# Patient Record
Sex: MALE | Race: WHITE | NOT HISPANIC OR LATINO | Employment: OTHER | URBAN - METROPOLITAN AREA
[De-identification: names, ages, dates, MRNs, and addresses within clinical notes are randomized per-mention and may not be internally consistent; named-entity substitution may affect disease eponyms.]

---

## 2017-02-08 ENCOUNTER — HOSPITAL ENCOUNTER (OUTPATIENT)
Dept: LAB | Facility: MEDICAL CENTER | Age: 72
End: 2017-02-08
Attending: FAMILY MEDICINE
Payer: MEDICARE

## 2017-02-08 LAB
ALBUMIN SERPL BCP-MCNC: 4.3 G/DL (ref 3.2–4.9)
ALBUMIN/GLOB SERPL: 1.7 G/DL
ALP SERPL-CCNC: 85 U/L (ref 30–99)
ALT SERPL-CCNC: 13 U/L (ref 2–50)
ANION GAP SERPL CALC-SCNC: 6 MMOL/L (ref 0–11.9)
AST SERPL-CCNC: 19 U/L (ref 12–45)
BASOPHILS # BLD AUTO: 0.05 K/UL (ref 0–0.12)
BASOPHILS NFR BLD AUTO: 0.7 % (ref 0–1.8)
BILIRUB SERPL-MCNC: 0.9 MG/DL (ref 0.1–1.5)
BUN SERPL-MCNC: 22 MG/DL (ref 8–22)
CALCIUM SERPL-MCNC: 9.5 MG/DL (ref 8.5–10.5)
CHLORIDE SERPL-SCNC: 106 MMOL/L (ref 96–112)
CHOLEST SERPL-MCNC: 273 MG/DL (ref 100–199)
CO2 SERPL-SCNC: 26 MMOL/L (ref 20–33)
CREAT SERPL-MCNC: 1.22 MG/DL (ref 0.5–1.4)
EOSINOPHIL # BLD: 0.44 K/UL (ref 0–0.51)
EOSINOPHIL NFR BLD AUTO: 6.5 % (ref 0–6.9)
ERYTHROCYTE [DISTWIDTH] IN BLOOD BY AUTOMATED COUNT: 43.6 FL (ref 35.9–50)
GLOBULIN SER CALC-MCNC: 2.5 G/DL (ref 1.9–3.5)
GLUCOSE SERPL-MCNC: 81 MG/DL (ref 65–99)
HAV IGM SERPL QL IA: NEGATIVE
HBV CORE IGM SERPL QL IA: NEGATIVE
HBV SURFACE AG SERPL QL IA: NEGATIVE
HCT VFR BLD AUTO: 44.9 % (ref 42–52)
HCV AB S/CO SERPL IA: NEGATIVE
HDLC SERPL-MCNC: 64 MG/DL
HGB BLD-MCNC: 15 G/DL (ref 14–18)
IMM GRANULOCYTES # BLD AUTO: 0.01 K/UL (ref 0–0.11)
IMM GRANULOCYTES NFR BLD AUTO: 0.1 % (ref 0–0.9)
LDLC SERPL CALC-MCNC: 189 MG/DL
LYMPHOCYTES # BLD: 2.02 K/UL (ref 1–4.8)
LYMPHOCYTES NFR BLD AUTO: 29.7 % (ref 22–41)
MCH RBC QN AUTO: 31.9 PG (ref 27–33)
MCHC RBC AUTO-ENTMCNC: 33.4 G/DL (ref 33.7–35.3)
MCV RBC AUTO: 95.5 FL (ref 81.4–97.8)
MONOCYTES # BLD: 0.55 K/UL (ref 0–0.85)
MONOCYTES NFR BLD AUTO: 8.1 % (ref 0–13.4)
NEUTROPHILS # BLD: 3.73 K/UL (ref 1.82–7.42)
NEUTROPHILS NFR BLD AUTO: 54.9 % (ref 44–72)
NRBC # BLD AUTO: 0 K/UL
NRBC BLD-RTO: 0 /100 WBC
PLATELET # BLD AUTO: 183 K/UL (ref 164–446)
PMV BLD AUTO: 10.6 FL (ref 9–12.9)
POTASSIUM SERPL-SCNC: 3.8 MMOL/L (ref 3.6–5.5)
PROT SERPL-MCNC: 6.8 G/DL (ref 6–8.2)
RBC # BLD AUTO: 4.7 M/UL (ref 4.7–6.1)
SODIUM SERPL-SCNC: 138 MMOL/L (ref 135–145)
TRIGL SERPL-MCNC: 101 MG/DL (ref 0–149)
WBC # BLD AUTO: 6.8 K/UL (ref 4.8–10.8)

## 2017-02-08 PROCEDURE — 85025 COMPLETE CBC W/AUTO DIFF WBC: CPT

## 2017-02-08 PROCEDURE — 36415 COLL VENOUS BLD VENIPUNCTURE: CPT

## 2017-02-08 PROCEDURE — 80074 ACUTE HEPATITIS PANEL: CPT

## 2017-02-08 PROCEDURE — 80053 COMPREHEN METABOLIC PANEL: CPT

## 2017-02-08 PROCEDURE — 80061 LIPID PANEL: CPT

## 2017-10-09 ENCOUNTER — HOSPITAL ENCOUNTER (OUTPATIENT)
Dept: LAB | Facility: MEDICAL CENTER | Age: 72
End: 2017-10-09
Attending: FAMILY MEDICINE
Payer: MEDICARE

## 2017-10-09 LAB
ALBUMIN SERPL BCP-MCNC: 3.9 G/DL (ref 3.2–4.9)
ALBUMIN/GLOB SERPL: 1.3 G/DL
ALP SERPL-CCNC: 81 U/L (ref 30–99)
ALT SERPL-CCNC: 17 U/L (ref 2–50)
ANION GAP SERPL CALC-SCNC: 7 MMOL/L (ref 0–11.9)
AST SERPL-CCNC: 21 U/L (ref 12–45)
BASOPHILS # BLD AUTO: 1 % (ref 0–1.8)
BASOPHILS # BLD: 0.06 K/UL (ref 0–0.12)
BILIRUB SERPL-MCNC: 0.8 MG/DL (ref 0.1–1.5)
BUN SERPL-MCNC: 23 MG/DL (ref 8–22)
CALCIUM SERPL-MCNC: 9.4 MG/DL (ref 8.5–10.5)
CHLORIDE SERPL-SCNC: 107 MMOL/L (ref 96–112)
CHOLEST SERPL-MCNC: 174 MG/DL (ref 100–199)
CO2 SERPL-SCNC: 26 MMOL/L (ref 20–33)
CREAT SERPL-MCNC: 0.97 MG/DL (ref 0.5–1.4)
EOSINOPHIL # BLD AUTO: 0.49 K/UL (ref 0–0.51)
EOSINOPHIL NFR BLD: 8.5 % (ref 0–6.9)
ERYTHROCYTE [DISTWIDTH] IN BLOOD BY AUTOMATED COUNT: 43.1 FL (ref 35.9–50)
GFR SERPL CREATININE-BSD FRML MDRD: >60 ML/MIN/1.73 M 2
GLOBULIN SER CALC-MCNC: 3 G/DL (ref 1.9–3.5)
GLUCOSE SERPL-MCNC: 77 MG/DL (ref 65–99)
HCT VFR BLD AUTO: 45.3 % (ref 42–52)
HDLC SERPL-MCNC: 68 MG/DL
HGB BLD-MCNC: 14.9 G/DL (ref 14–18)
IMM GRANULOCYTES # BLD AUTO: 0.01 K/UL (ref 0–0.11)
IMM GRANULOCYTES NFR BLD AUTO: 0.2 % (ref 0–0.9)
LDLC SERPL CALC-MCNC: 94 MG/DL
LYMPHOCYTES # BLD AUTO: 1.88 K/UL (ref 1–4.8)
LYMPHOCYTES NFR BLD: 32.6 % (ref 22–41)
MCH RBC QN AUTO: 31 PG (ref 27–33)
MCHC RBC AUTO-ENTMCNC: 32.9 G/DL (ref 33.7–35.3)
MCV RBC AUTO: 94.2 FL (ref 81.4–97.8)
MONOCYTES # BLD AUTO: 0.49 K/UL (ref 0–0.85)
MONOCYTES NFR BLD AUTO: 8.5 % (ref 0–13.4)
NEUTROPHILS # BLD AUTO: 2.83 K/UL (ref 1.82–7.42)
NEUTROPHILS NFR BLD: 49.2 % (ref 44–72)
NRBC # BLD AUTO: 0 K/UL
NRBC BLD AUTO-RTO: 0 /100 WBC
PLATELET # BLD AUTO: 225 K/UL (ref 164–446)
PMV BLD AUTO: 10.1 FL (ref 9–12.9)
POTASSIUM SERPL-SCNC: 4.6 MMOL/L (ref 3.6–5.5)
PROT SERPL-MCNC: 6.9 G/DL (ref 6–8.2)
PSA SERPL-MCNC: 3.23 NG/ML (ref 0–4)
RBC # BLD AUTO: 4.81 M/UL (ref 4.7–6.1)
SODIUM SERPL-SCNC: 140 MMOL/L (ref 135–145)
T4 FREE SERPL-MCNC: 0.81 NG/DL (ref 0.53–1.43)
TRIGL SERPL-MCNC: 58 MG/DL (ref 0–149)
TSH SERPL DL<=0.005 MIU/L-ACNC: 5.24 UIU/ML (ref 0.3–3.7)
WBC # BLD AUTO: 5.8 K/UL (ref 4.8–10.8)

## 2017-10-09 PROCEDURE — 80061 LIPID PANEL: CPT

## 2017-10-09 PROCEDURE — 36415 COLL VENOUS BLD VENIPUNCTURE: CPT

## 2017-10-09 PROCEDURE — 84439 ASSAY OF FREE THYROXINE: CPT

## 2017-10-09 PROCEDURE — 84443 ASSAY THYROID STIM HORMONE: CPT

## 2017-10-09 PROCEDURE — 84153 ASSAY OF PSA TOTAL: CPT

## 2017-10-09 PROCEDURE — 80053 COMPREHEN METABOLIC PANEL: CPT

## 2017-10-09 PROCEDURE — 85025 COMPLETE CBC W/AUTO DIFF WBC: CPT

## 2017-11-17 ENCOUNTER — HOSPITAL ENCOUNTER (OUTPATIENT)
Dept: LAB | Facility: MEDICAL CENTER | Age: 72
End: 2017-11-17
Attending: FAMILY MEDICINE
Payer: MEDICARE

## 2017-11-17 LAB
T4 FREE SERPL-MCNC: 0.91 NG/DL (ref 0.53–1.43)
TSH SERPL DL<=0.005 MIU/L-ACNC: 3.98 UIU/ML (ref 0.3–3.7)

## 2017-11-17 PROCEDURE — 84443 ASSAY THYROID STIM HORMONE: CPT

## 2017-11-17 PROCEDURE — 36415 COLL VENOUS BLD VENIPUNCTURE: CPT

## 2017-11-17 PROCEDURE — 84439 ASSAY OF FREE THYROXINE: CPT

## 2018-10-05 ENCOUNTER — HOSPITAL ENCOUNTER (OUTPATIENT)
Dept: LAB | Facility: MEDICAL CENTER | Age: 73
End: 2018-10-05
Attending: FAMILY MEDICINE
Payer: MEDICARE

## 2018-10-05 LAB
ALBUMIN SERPL BCP-MCNC: 4.1 G/DL (ref 3.2–4.9)
ALBUMIN/GLOB SERPL: 1.2 G/DL
ALP SERPL-CCNC: 72 U/L (ref 30–99)
ALT SERPL-CCNC: 19 U/L (ref 2–50)
ANION GAP SERPL CALC-SCNC: 9 MMOL/L (ref 0–11.9)
AST SERPL-CCNC: 18 U/L (ref 12–45)
BASOPHILS # BLD AUTO: 0.8 % (ref 0–1.8)
BASOPHILS # BLD: 0.05 K/UL (ref 0–0.12)
BILIRUB SERPL-MCNC: 0.8 MG/DL (ref 0.1–1.5)
BUN SERPL-MCNC: 28 MG/DL (ref 8–22)
CALCIUM SERPL-MCNC: 9.5 MG/DL (ref 8.5–10.5)
CHLORIDE SERPL-SCNC: 106 MMOL/L (ref 96–112)
CHOLEST SERPL-MCNC: 180 MG/DL (ref 100–199)
CO2 SERPL-SCNC: 25 MMOL/L (ref 20–33)
CREAT SERPL-MCNC: 1.24 MG/DL (ref 0.5–1.4)
EOSINOPHIL # BLD AUTO: 0.49 K/UL (ref 0–0.51)
EOSINOPHIL NFR BLD: 8.1 % (ref 0–6.9)
ERYTHROCYTE [DISTWIDTH] IN BLOOD BY AUTOMATED COUNT: 43.1 FL (ref 35.9–50)
GLOBULIN SER CALC-MCNC: 3.3 G/DL (ref 1.9–3.5)
GLUCOSE SERPL-MCNC: 105 MG/DL (ref 65–99)
HCT VFR BLD AUTO: 46.3 % (ref 42–52)
HDLC SERPL-MCNC: 65 MG/DL
HGB BLD-MCNC: 15.6 G/DL (ref 14–18)
IMM GRANULOCYTES # BLD AUTO: 0.01 K/UL (ref 0–0.11)
IMM GRANULOCYTES NFR BLD AUTO: 0.2 % (ref 0–0.9)
LDLC SERPL CALC-MCNC: 98 MG/DL
LYMPHOCYTES # BLD AUTO: 1.58 K/UL (ref 1–4.8)
LYMPHOCYTES NFR BLD: 26.2 % (ref 22–41)
MCH RBC QN AUTO: 32 PG (ref 27–33)
MCHC RBC AUTO-ENTMCNC: 33.7 G/DL (ref 33.7–35.3)
MCV RBC AUTO: 95.1 FL (ref 81.4–97.8)
MONOCYTES # BLD AUTO: 0.54 K/UL (ref 0–0.85)
MONOCYTES NFR BLD AUTO: 9 % (ref 0–13.4)
NEUTROPHILS # BLD AUTO: 3.35 K/UL (ref 1.82–7.42)
NEUTROPHILS NFR BLD: 55.7 % (ref 44–72)
NRBC # BLD AUTO: 0 K/UL
NRBC BLD-RTO: 0 /100 WBC
PLATELET # BLD AUTO: 204 K/UL (ref 164–446)
PMV BLD AUTO: 11.2 FL (ref 9–12.9)
POTASSIUM SERPL-SCNC: 4.3 MMOL/L (ref 3.6–5.5)
PROT SERPL-MCNC: 7.4 G/DL (ref 6–8.2)
PSA SERPL-MCNC: 3.18 NG/ML (ref 0–4)
RBC # BLD AUTO: 4.87 M/UL (ref 4.7–6.1)
SODIUM SERPL-SCNC: 140 MMOL/L (ref 135–145)
TRIGL SERPL-MCNC: 86 MG/DL (ref 0–149)
TSH SERPL DL<=0.005 MIU/L-ACNC: 7.83 UIU/ML (ref 0.38–5.33)
WBC # BLD AUTO: 6 K/UL (ref 4.8–10.8)

## 2018-10-05 PROCEDURE — 84153 ASSAY OF PSA TOTAL: CPT

## 2018-10-05 PROCEDURE — 85025 COMPLETE CBC W/AUTO DIFF WBC: CPT

## 2018-10-05 PROCEDURE — 80053 COMPREHEN METABOLIC PANEL: CPT

## 2018-10-05 PROCEDURE — 84443 ASSAY THYROID STIM HORMONE: CPT

## 2018-10-05 PROCEDURE — 80061 LIPID PANEL: CPT

## 2018-10-05 PROCEDURE — 36415 COLL VENOUS BLD VENIPUNCTURE: CPT

## 2018-11-09 ENCOUNTER — HOSPITAL ENCOUNTER (OUTPATIENT)
Dept: LAB | Facility: MEDICAL CENTER | Age: 73
End: 2018-11-09
Attending: FAMILY MEDICINE
Payer: MEDICARE

## 2018-11-09 LAB
T4 FREE SERPL-MCNC: 0.74 NG/DL (ref 0.53–1.43)
TSH SERPL DL<=0.005 MIU/L-ACNC: 4.53 UIU/ML (ref 0.38–5.33)

## 2018-11-09 PROCEDURE — 36415 COLL VENOUS BLD VENIPUNCTURE: CPT

## 2018-11-09 PROCEDURE — 84439 ASSAY OF FREE THYROXINE: CPT

## 2018-11-09 PROCEDURE — 84443 ASSAY THYROID STIM HORMONE: CPT

## 2018-11-27 ENCOUNTER — PATIENT OUTREACH (OUTPATIENT)
Dept: HEALTH INFORMATION MANAGEMENT | Facility: OTHER | Age: 73
End: 2018-11-27

## 2018-11-27 NOTE — PROGRESS NOTES
pt called to inforn us that he already did his colonoscopy , sent a records request. pt also stated he DID NOT want the gift card.

## 2018-12-18 ENCOUNTER — DOCUMENTATION (OUTPATIENT)
Dept: CARDIOLOGY | Facility: MEDICAL CENTER | Age: 73
End: 2018-12-18

## 2018-12-18 NOTE — PROGRESS NOTES
Request sent to Dr. Santizo's office for recent EKG and any additional cardiac testing for upcoming NP appt with CR on 12/31/18.

## 2018-12-31 ENCOUNTER — OFFICE VISIT (OUTPATIENT)
Dept: CARDIOLOGY | Facility: MEDICAL CENTER | Age: 73
End: 2018-12-31
Payer: MEDICARE

## 2018-12-31 VITALS
HEIGHT: 70 IN | HEART RATE: 66 BPM | OXYGEN SATURATION: 98 % | BODY MASS INDEX: 26.77 KG/M2 | DIASTOLIC BLOOD PRESSURE: 80 MMHG | WEIGHT: 187 LBS | SYSTOLIC BLOOD PRESSURE: 150 MMHG

## 2018-12-31 DIAGNOSIS — I45.10 RBBB: ICD-10-CM

## 2018-12-31 DIAGNOSIS — R00.1 BRADYCARDIA: ICD-10-CM

## 2018-12-31 DIAGNOSIS — I45.2 BIFASCICULAR BLOCK: ICD-10-CM

## 2018-12-31 DIAGNOSIS — I10 ESSENTIAL HYPERTENSION: ICD-10-CM

## 2018-12-31 LAB — EKG IMPRESSION: NORMAL

## 2018-12-31 PROCEDURE — 93000 ELECTROCARDIOGRAM COMPLETE: CPT | Performed by: INTERNAL MEDICINE

## 2018-12-31 PROCEDURE — 99204 OFFICE O/P NEW MOD 45 MIN: CPT | Performed by: INTERNAL MEDICINE

## 2018-12-31 RX ORDER — LISINOPRIL 30 MG/1
30 TABLET ORAL DAILY
COMMUNITY

## 2018-12-31 RX ORDER — ATORVASTATIN CALCIUM 10 MG/1
10 TABLET, FILM COATED ORAL NIGHTLY
COMMUNITY

## 2018-12-31 ASSESSMENT — ENCOUNTER SYMPTOMS
BLURRED VISION: 0
WEIGHT LOSS: 0
NERVOUS/ANXIOUS: 0
WEAKNESS: 0
CLAUDICATION: 0
PALPITATIONS: 0
SHORTNESS OF BREATH: 0
BRUISES/BLEEDS EASILY: 0
VOMITING: 0
COUGH: 0
NAUSEA: 0
DIZZINESS: 0
INSOMNIA: 0
FOCAL WEAKNESS: 0
SENSORY CHANGE: 0

## 2018-12-31 NOTE — LETTER
"     [facility]   [address], [address]  [place] [address]  Phone: [phone]  Fax: [phone]              Thomas Slater  1945    Encounter Date: 12/31/2018    Sunny French M.D.          PROGRESS NOTE:  Chief Complaint   Patient presents with   • Abnormal EKG     new patient       Subjective:   Thomas Slater is a 73 y.o. male who presents today for evaluation of above issue    He is seen in consultation at the request of Dr. Grzegorz Santizo for abnormal electrocardiogram.  Has long-standing history of hypertension and hypercholesteremia.  He is physically quite active without any cardiac symptoms.  He walks about 3 miles a day.  He during his annual physical in the fall had an echocardiogram which my review show sinus rhythm with right bundle branch block with left anterior fascicular block and PAC.  He denies any palpitation, dizziness or syncope.  He recall being told a couple years ago when he had a [employer] screening test that he had \"extra heartbeat\".    Past Medical History:   Diagnosis Date   • Abnormal EKG    • Hyperlipidemia    • Hypertension      History reviewed. No pertinent surgical history.  Family History   Problem Relation Age of Onset   • Heart Disease Neg Hx      Social History     Social History   • Marital status: Single     Spouse name: N/A   • Number of children: N/A   • Years of education: N/A     Occupational History   • Not on file.     Social History Main Topics   • Smoking status: Former Smoker     Quit date: 12/31/1972   • Smokeless tobacco: Never Used   • Alcohol use No   • Drug use: Unknown   • Sexual activity: Not on file     Other Topics Concern   • Not on file     Social History Narrative   • No narrative on file     No Known Allergies  Outpatient Encounter Prescriptions as of 12/31/2018   Medication Sig Dispense Refill   • aspirin EC (ECOTRIN) 81 MG Tablet Delayed Response Take 81 mg by mouth every day.     • " "lisinopril (PRINIVIL, ZESTRIL) 30 MG tablet Take 30 mg by mouth every day.     • atorvastatin (LIPITOR) 10 MG Tab Take 10 mg by mouth every evening.     • Fish Oil-Krill Oil (OMEGA-3 DUAL SPECTRUM PO) Take  by mouth.     • B Complex-C (SUPER B COMPLEX PO) Take  by mouth.     • Coenzyme Q10 (CO Q 10 PO) Take  by mouth.     • Multiple Vitamin (MULTI VITAMIN DAILY PO) Take  by mouth.     • Zn-Pyg Afri-Nettle-Saw Palmet (SAW PALMETTO COMPLEX PO) Take  by mouth.     • Apoaequorin (PREVAGEN PO) Take  by mouth.     • Inulin (FIBER CHOICE FRUITY BITES PO) Take  by mouth.       No facility-administered encounter medications on file as of 12/31/2018.      Review of Systems   Constitutional: Negative for malaise/fatigue and weight loss.   HENT: Negative for congestion, hearing loss and nosebleeds.    Eyes: Negative for blurred vision.   Respiratory: Negative for cough and shortness of breath.    Cardiovascular: Negative for chest pain, palpitations, claudication and leg swelling.        Blood pressure at home usually in the 140/70 mmHg, occasionally 160/90   Gastrointestinal: Negative for nausea and vomiting.   Skin: Negative for rash.   Neurological: Negative for dizziness, sensory change, focal weakness and weakness.   Endo/Heme/Allergies: Negative for environmental allergies. Does not bruise/bleed easily.   Psychiatric/Behavioral: The patient is not nervous/anxious and does not have insomnia.    All other systems reviewed and are negative.       Objective:   /80   Pulse 66   Ht 1.765 m (5' 9.5\")   Wt 84.8 kg (187 lb)   SpO2 98%   BMI 27.22 kg/m²      Physical Exam   Constitutional: He is oriented to person, place, and time. He appears well-developed and well-nourished. No distress.   HENT:   Head: Normocephalic and atraumatic.   Neck: No JVD present. No thyromegaly present.   Cardiovascular: S1 normal and S2 normal.  An irregular rhythm present.  Occasional extrasystoles are present. Bradycardia present.  Exam " reveals no gallop.    No murmur heard.  Pulmonary/Chest: Effort normal and breath sounds normal. He has no wheezes. He has no rales.   Abdominal: Soft. He exhibits no distension.   Musculoskeletal: Normal range of motion. He exhibits no edema or deformity.   Neurological: He is alert and oriented to person, place, and time. No cranial nerve deficit.   Skin: Skin is warm. No erythema.   Psychiatric: He has a normal mood and affect. His behavior is normal.     EKG today by my review shows sinus rhythm with bifascicular block    Assessment:     1. Essential hypertension  EKG   2. PACs EKG   3. Bifascicular block         Medical Decision Making:  Today's Assessment / Status / Plan:     I had a relatively lengthy discussion with him about the finding of his EKG.  His symptoms are good exercise tolerance.  His physical exam did not suggest any structural heart disease.  The bundle branch block likely in part from his age but may be contributed in part from his hypertensive heart disease.  I feel that he needs to ensure that his blood pressures under optimal control.  He was advised to monitor his blood pressure more closely and to contact his PCP Dr. Santizo or our office to make adjustment his medication accordingly.  Given his bifascicular block I informed him that there is a possibility that he may develop symptomatic bradycardia in the future.  In that situation he may require a some formal pacemaker.  He was advised to continue regular follow-up with his primary care provider.  We will be happy to see him once a year or on an as needed basis.  We will be happy to see him at any time if he becomes symptomatic.  Thank you for allowing us to participate in the care of this patient.      No Recipients

## 2019-01-01 NOTE — PROGRESS NOTES
"Chief Complaint   Patient presents with   • Abnormal EKG     new patient       Subjective:   Thomas Slater is a 73 y.o. male who presents today for evaluation of above issue    He is seen in consultation at the request of Dr. Grzegorz Santizo for abnormal electrocardiogram.  Has long-standing history of hypertension and hypercholesteremia.  He is physically quite active without any cardiac symptoms.  He walks about 3 miles a day.  He during his annual physical in the fall had an echocardiogram which my review show sinus rhythm with right bundle branch block with left anterior fascicular block and PAC.  He denies any palpitation, dizziness or syncope.  He recall being told a couple years ago when he had a [employer] screening test that he had \"extra heartbeat\".    Past Medical History:   Diagnosis Date   • Abnormal EKG    • Hyperlipidemia    • Hypertension      History reviewed. No pertinent surgical history.  Family History   Problem Relation Age of Onset   • Heart Disease Neg Hx      Social History     Social History   • Marital status: Single     Spouse name: N/A   • Number of children: N/A   • Years of education: N/A     Occupational History   • Not on file.     Social History Main Topics   • Smoking status: Former Smoker     Quit date: 12/31/1972   • Smokeless tobacco: Never Used   • Alcohol use No   • Drug use: Unknown   • Sexual activity: Not on file     Other Topics Concern   • Not on file     Social History Narrative   • No narrative on file     No Known Allergies  Outpatient Encounter Prescriptions as of 12/31/2018   Medication Sig Dispense Refill   • aspirin EC (ECOTRIN) 81 MG Tablet Delayed Response Take 81 mg by mouth every day.     • lisinopril (PRINIVIL, ZESTRIL) 30 MG tablet Take 30 mg by mouth every day.     • atorvastatin (LIPITOR) 10 MG Tab Take 10 mg by mouth every evening.     • Fish Oil-Krill Oil (OMEGA-3 DUAL SPECTRUM PO) Take  by mouth.     • B Complex-C (SUPER B COMPLEX PO) Take  by mouth.  " "   • Coenzyme Q10 (CO Q 10 PO) Take  by mouth.     • Multiple Vitamin (MULTI VITAMIN DAILY PO) Take  by mouth.     • Zn-Pyg Afri-Nettle-Saw Palmet (SAW PALMETTO COMPLEX PO) Take  by mouth.     • Apoaequorin (PREVAGEN PO) Take  by mouth.     • Inulin (FIBER CHOICE FRUITY BITES PO) Take  by mouth.       No facility-administered encounter medications on file as of 12/31/2018.      Review of Systems   Constitutional: Negative for malaise/fatigue and weight loss.   HENT: Negative for congestion, hearing loss and nosebleeds.    Eyes: Negative for blurred vision.   Respiratory: Negative for cough and shortness of breath.    Cardiovascular: Negative for chest pain, palpitations, claudication and leg swelling.        Blood pressure at home usually in the 140/70 mmHg, occasionally 160/90   Gastrointestinal: Negative for nausea and vomiting.   Skin: Negative for rash.   Neurological: Negative for dizziness, sensory change, focal weakness and weakness.   Endo/Heme/Allergies: Negative for environmental allergies. Does not bruise/bleed easily.   Psychiatric/Behavioral: The patient is not nervous/anxious and does not have insomnia.    All other systems reviewed and are negative.       Objective:   /80   Pulse 66   Ht 1.765 m (5' 9.5\")   Wt 84.8 kg (187 lb)   SpO2 98%   BMI 27.22 kg/m²     Physical Exam   Constitutional: He is oriented to person, place, and time. He appears well-developed and well-nourished. No distress.   HENT:   Head: Normocephalic and atraumatic.   Neck: No JVD present. No thyromegaly present.   Cardiovascular: S1 normal and S2 normal.  An irregular rhythm present.  Occasional extrasystoles are present. Bradycardia present.  Exam reveals no gallop.    No murmur heard.  Pulmonary/Chest: Effort normal and breath sounds normal. He has no wheezes. He has no rales.   Abdominal: Soft. He exhibits no distension.   Musculoskeletal: Normal range of motion. He exhibits no edema or deformity.   Neurological: He " is alert and oriented to person, place, and time. No cranial nerve deficit.   Skin: Skin is warm. No erythema.   Psychiatric: He has a normal mood and affect. His behavior is normal.     EKG today by my review shows sinus rhythm with bifascicular block    Assessment:     1. Essential hypertension  EKG   2. PACs EKG   3. Bifascicular block         Medical Decision Making:  Today's Assessment / Status / Plan:     I had a relatively lengthy discussion with him about the finding of his EKG.  His symptoms are good exercise tolerance.  His physical exam did not suggest any structural heart disease.  The bundle branch block likely in part from his age but may be contributed in part from his hypertensive heart disease.  I feel that he needs to ensure that his blood pressures under optimal control.  He was advised to monitor his blood pressure more closely and to contact his PCP Dr. Santizo or our office to make adjustment his medication accordingly.  Given his bifascicular block I informed him that there is a possibility that he may develop symptomatic bradycardia in the future.  In that situation he may require a some formal pacemaker.  He was advised to continue regular follow-up with his primary care provider.  We will be happy to see him once a year or on an as needed basis.  We will be happy to see him at any time if he becomes symptomatic.  Thank you for allowing us to participate in the care of this patient.

## 2019-10-10 ENCOUNTER — HOSPITAL ENCOUNTER (OUTPATIENT)
Dept: RADIOLOGY | Facility: MEDICAL CENTER | Age: 74
End: 2019-10-10
Attending: FAMILY MEDICINE
Payer: MEDICARE

## 2019-10-10 ENCOUNTER — HOSPITAL ENCOUNTER (OUTPATIENT)
Dept: LAB | Facility: MEDICAL CENTER | Age: 74
End: 2019-10-10
Attending: FAMILY MEDICINE
Payer: MEDICARE

## 2019-10-10 DIAGNOSIS — M25.561 RIGHT KNEE PAIN, UNSPECIFIED CHRONICITY: ICD-10-CM

## 2019-10-10 LAB
ALBUMIN SERPL BCP-MCNC: 4 G/DL (ref 3.2–4.9)
ALBUMIN/GLOB SERPL: 1.5 G/DL
ALP SERPL-CCNC: 75 U/L (ref 30–99)
ALT SERPL-CCNC: 13 U/L (ref 2–50)
ANION GAP SERPL CALC-SCNC: 3 MMOL/L (ref 0–11.9)
AST SERPL-CCNC: 18 U/L (ref 12–45)
BASOPHILS # BLD AUTO: 0.9 % (ref 0–1.8)
BASOPHILS # BLD: 0.05 K/UL (ref 0–0.12)
BILIRUB SERPL-MCNC: 0.7 MG/DL (ref 0.1–1.5)
BUN SERPL-MCNC: 23 MG/DL (ref 8–22)
CALCIUM SERPL-MCNC: 9 MG/DL (ref 8.5–10.5)
CHLORIDE SERPL-SCNC: 108 MMOL/L (ref 96–112)
CHOLEST SERPL-MCNC: 181 MG/DL (ref 100–199)
CO2 SERPL-SCNC: 28 MMOL/L (ref 20–33)
CREAT SERPL-MCNC: 1.31 MG/DL (ref 0.5–1.4)
EOSINOPHIL # BLD AUTO: 0.35 K/UL (ref 0–0.51)
EOSINOPHIL NFR BLD: 6.4 % (ref 0–6.9)
ERYTHROCYTE [DISTWIDTH] IN BLOOD BY AUTOMATED COUNT: 42.4 FL (ref 35.9–50)
FASTING STATUS PATIENT QL REPORTED: NORMAL
GLOBULIN SER CALC-MCNC: 2.7 G/DL (ref 1.9–3.5)
GLUCOSE SERPL-MCNC: 96 MG/DL (ref 65–99)
HCT VFR BLD AUTO: 46.6 % (ref 42–52)
HDLC SERPL-MCNC: 65 MG/DL
HGB BLD-MCNC: 15.4 G/DL (ref 14–18)
IMM GRANULOCYTES # BLD AUTO: 0.02 K/UL (ref 0–0.11)
IMM GRANULOCYTES NFR BLD AUTO: 0.4 % (ref 0–0.9)
LDLC SERPL CALC-MCNC: 103 MG/DL
LYMPHOCYTES # BLD AUTO: 1.5 K/UL (ref 1–4.8)
LYMPHOCYTES NFR BLD: 27.4 % (ref 22–41)
MCH RBC QN AUTO: 31.5 PG (ref 27–33)
MCHC RBC AUTO-ENTMCNC: 33 G/DL (ref 33.7–35.3)
MCV RBC AUTO: 95.3 FL (ref 81.4–97.8)
MONOCYTES # BLD AUTO: 0.5 K/UL (ref 0–0.85)
MONOCYTES NFR BLD AUTO: 9.1 % (ref 0–13.4)
NEUTROPHILS # BLD AUTO: 3.06 K/UL (ref 1.82–7.42)
NEUTROPHILS NFR BLD: 55.8 % (ref 44–72)
NRBC # BLD AUTO: 0 K/UL
NRBC BLD-RTO: 0 /100 WBC
PLATELET # BLD AUTO: 190 K/UL (ref 164–446)
PMV BLD AUTO: 10.7 FL (ref 9–12.9)
POTASSIUM SERPL-SCNC: 4.6 MMOL/L (ref 3.6–5.5)
PROT SERPL-MCNC: 6.7 G/DL (ref 6–8.2)
PSA SERPL-MCNC: 3.76 NG/ML (ref 0–4)
RBC # BLD AUTO: 4.89 M/UL (ref 4.7–6.1)
SODIUM SERPL-SCNC: 139 MMOL/L (ref 135–145)
T4 SERPL-MCNC: 6.3 UG/DL (ref 4–12)
TRIGL SERPL-MCNC: 63 MG/DL (ref 0–149)
TSH SERPL DL<=0.005 MIU/L-ACNC: 5.45 UIU/ML (ref 0.38–5.33)
WBC # BLD AUTO: 5.5 K/UL (ref 4.8–10.8)

## 2019-10-10 PROCEDURE — 36415 COLL VENOUS BLD VENIPUNCTURE: CPT

## 2019-10-10 PROCEDURE — 80061 LIPID PANEL: CPT

## 2019-10-10 PROCEDURE — 84436 ASSAY OF TOTAL THYROXINE: CPT

## 2019-10-10 PROCEDURE — 84443 ASSAY THYROID STIM HORMONE: CPT

## 2019-10-10 PROCEDURE — 84153 ASSAY OF PSA TOTAL: CPT

## 2019-10-10 PROCEDURE — 80053 COMPREHEN METABOLIC PANEL: CPT

## 2019-10-10 PROCEDURE — 85025 COMPLETE CBC W/AUTO DIFF WBC: CPT

## 2019-10-10 PROCEDURE — 73562 X-RAY EXAM OF KNEE 3: CPT | Mod: RT

## 2020-05-29 ENCOUNTER — HOSPITAL ENCOUNTER (OUTPATIENT)
Dept: LAB | Facility: MEDICAL CENTER | Age: 75
End: 2020-05-29
Attending: FAMILY MEDICINE
Payer: MEDICARE

## 2020-05-29 LAB
BASOPHILS # BLD AUTO: 0.8 % (ref 0–1.8)
BASOPHILS # BLD: 0.05 K/UL (ref 0–0.12)
EOSINOPHIL # BLD AUTO: 0.49 K/UL (ref 0–0.51)
EOSINOPHIL NFR BLD: 8.1 % (ref 0–6.9)
ERYTHROCYTE [DISTWIDTH] IN BLOOD BY AUTOMATED COUNT: 43.9 FL (ref 35.9–50)
HCT VFR BLD AUTO: 45.8 % (ref 42–52)
HGB BLD-MCNC: 14.8 G/DL (ref 14–18)
IMM GRANULOCYTES # BLD AUTO: 0.01 K/UL (ref 0–0.11)
IMM GRANULOCYTES NFR BLD AUTO: 0.2 % (ref 0–0.9)
LYMPHOCYTES # BLD AUTO: 1.84 K/UL (ref 1–4.8)
LYMPHOCYTES NFR BLD: 30.6 % (ref 22–41)
MCH RBC QN AUTO: 31.5 PG (ref 27–33)
MCHC RBC AUTO-ENTMCNC: 32.3 G/DL (ref 33.7–35.3)
MCV RBC AUTO: 97.4 FL (ref 81.4–97.8)
MONOCYTES # BLD AUTO: 0.58 K/UL (ref 0–0.85)
MONOCYTES NFR BLD AUTO: 9.6 % (ref 0–13.4)
NEUTROPHILS # BLD AUTO: 3.05 K/UL (ref 1.82–7.42)
NEUTROPHILS NFR BLD: 50.7 % (ref 44–72)
NRBC # BLD AUTO: 0 K/UL
NRBC BLD-RTO: 0 /100 WBC
PLATELET # BLD AUTO: 195 K/UL (ref 164–446)
PMV BLD AUTO: 10.3 FL (ref 9–12.9)
RBC # BLD AUTO: 4.7 M/UL (ref 4.7–6.1)
WBC # BLD AUTO: 6 K/UL (ref 4.8–10.8)

## 2020-05-29 PROCEDURE — 85025 COMPLETE CBC W/AUTO DIFF WBC: CPT

## 2020-05-29 PROCEDURE — 36415 COLL VENOUS BLD VENIPUNCTURE: CPT

## 2020-05-29 PROCEDURE — 80061 LIPID PANEL: CPT

## 2020-05-29 PROCEDURE — 80053 COMPREHEN METABOLIC PANEL: CPT

## 2020-05-29 PROCEDURE — 84480 ASSAY TRIIODOTHYRONINE (T3): CPT

## 2020-05-29 PROCEDURE — 84439 ASSAY OF FREE THYROXINE: CPT

## 2020-05-29 PROCEDURE — 84482 T3 REVERSE: CPT

## 2020-05-29 PROCEDURE — 84443 ASSAY THYROID STIM HORMONE: CPT

## 2020-05-30 LAB
ALBUMIN SERPL BCP-MCNC: 4 G/DL (ref 3.2–4.9)
ALBUMIN/GLOB SERPL: 1.5 G/DL
ALP SERPL-CCNC: 86 U/L (ref 30–99)
ALT SERPL-CCNC: 18 U/L (ref 2–50)
ANION GAP SERPL CALC-SCNC: 11 MMOL/L (ref 7–16)
AST SERPL-CCNC: 20 U/L (ref 12–45)
BILIRUB SERPL-MCNC: 0.5 MG/DL (ref 0.1–1.5)
BUN SERPL-MCNC: 30 MG/DL (ref 8–22)
CALCIUM SERPL-MCNC: 9.3 MG/DL (ref 8.5–10.5)
CHLORIDE SERPL-SCNC: 106 MMOL/L (ref 96–112)
CHOLEST SERPL-MCNC: 160 MG/DL (ref 100–199)
CO2 SERPL-SCNC: 24 MMOL/L (ref 20–33)
CREAT SERPL-MCNC: 1.19 MG/DL (ref 0.5–1.4)
FASTING STATUS PATIENT QL REPORTED: NORMAL
GLOBULIN SER CALC-MCNC: 2.7 G/DL (ref 1.9–3.5)
GLUCOSE SERPL-MCNC: 90 MG/DL (ref 65–99)
HDLC SERPL-MCNC: 69 MG/DL
LDLC SERPL CALC-MCNC: 80 MG/DL
POTASSIUM SERPL-SCNC: 4.4 MMOL/L (ref 3.6–5.5)
PROT SERPL-MCNC: 6.7 G/DL (ref 6–8.2)
SODIUM SERPL-SCNC: 141 MMOL/L (ref 135–145)
T3 SERPL-MCNC: 103 NG/DL (ref 60–181)
T4 FREE SERPL-MCNC: 0.91 NG/DL (ref 0.93–1.7)
TRIGL SERPL-MCNC: 54 MG/DL (ref 0–149)
TSH SERPL DL<=0.005 MIU/L-ACNC: 7.73 UIU/ML (ref 0.38–5.33)

## 2020-06-02 LAB — T3REVERSE SERPL-MCNC: 13.9 NG/DL (ref 9–27)

## 2020-07-27 ENCOUNTER — HOSPITAL ENCOUNTER (OUTPATIENT)
Dept: RADIOLOGY | Facility: MEDICAL CENTER | Age: 75
End: 2020-07-27
Attending: FAMILY MEDICINE
Payer: MEDICARE

## 2020-07-27 DIAGNOSIS — R94.6 NONSPECIFIC ABNORMAL RESULTS OF THYROID FUNCTION STUDY: ICD-10-CM

## 2020-07-27 PROCEDURE — 76536 US EXAM OF HEAD AND NECK: CPT

## 2020-08-10 ENCOUNTER — HOSPITAL ENCOUNTER (OUTPATIENT)
Dept: LAB | Facility: MEDICAL CENTER | Age: 75
End: 2020-08-10
Attending: FAMILY MEDICINE
Payer: MEDICARE

## 2020-08-10 LAB
T4 FREE SERPL-MCNC: 0.94 NG/DL (ref 0.93–1.7)
T4 SERPL-MCNC: 6.2 UG/DL (ref 4–12)
THYROPEROXIDASE AB SERPL-ACNC: 9 IU/ML (ref 0–9)
TSH SERPL DL<=0.005 MIU/L-ACNC: 6.99 UIU/ML (ref 0.38–5.33)

## 2020-08-10 PROCEDURE — 84439 ASSAY OF FREE THYROXINE: CPT

## 2020-08-10 PROCEDURE — 36415 COLL VENOUS BLD VENIPUNCTURE: CPT

## 2020-08-10 PROCEDURE — 86376 MICROSOMAL ANTIBODY EACH: CPT

## 2020-08-10 PROCEDURE — 84443 ASSAY THYROID STIM HORMONE: CPT

## 2020-08-10 PROCEDURE — 86800 THYROGLOBULIN ANTIBODY: CPT

## 2020-08-12 LAB — THYROGLOB AB SERPL-ACNC: <0.9 IU/ML (ref 0–4)

## 2020-09-15 ENCOUNTER — HOSPITAL ENCOUNTER (OUTPATIENT)
Dept: LAB | Facility: MEDICAL CENTER | Age: 75
End: 2020-09-15
Attending: OPHTHALMOLOGY
Payer: MEDICARE

## 2020-09-15 LAB — PSA SERPL-MCNC: 3.13 NG/ML (ref 0–4)

## 2020-09-15 PROCEDURE — 36415 COLL VENOUS BLD VENIPUNCTURE: CPT

## 2020-09-15 PROCEDURE — 84153 ASSAY OF PSA TOTAL: CPT

## 2020-10-07 ENCOUNTER — HOSPITAL ENCOUNTER (OUTPATIENT)
Dept: RADIOLOGY | Facility: MEDICAL CENTER | Age: 75
End: 2020-10-07
Attending: FAMILY MEDICINE
Payer: MEDICARE

## 2020-10-07 DIAGNOSIS — I10 ESSENTIAL HYPERTENSION, BENIGN: ICD-10-CM

## 2020-10-07 PROCEDURE — 93975 VASCULAR STUDY: CPT

## 2020-10-07 PROCEDURE — 76775 US EXAM ABDO BACK WALL LIM: CPT

## 2020-10-26 ENCOUNTER — HOSPITAL ENCOUNTER (OUTPATIENT)
Dept: CARDIOLOGY | Facility: MEDICAL CENTER | Age: 75
End: 2020-10-26
Attending: FAMILY MEDICINE
Payer: MEDICARE

## 2020-10-26 DIAGNOSIS — I10 ESSENTIAL HYPERTENSION, BENIGN: ICD-10-CM

## 2020-10-26 LAB
LV EJECT FRACT  99904: 55
LV EJECT FRACT MOD 2C 99903: 69.06
LV EJECT FRACT MOD 4C 99902: 57.28
LV EJECT FRACT MOD BP 99901: 58.59

## 2020-10-26 PROCEDURE — 93306 TTE W/DOPPLER COMPLETE: CPT

## 2020-10-26 PROCEDURE — 93306 TTE W/DOPPLER COMPLETE: CPT | Mod: 26 | Performed by: INTERNAL MEDICINE

## 2021-01-11 DIAGNOSIS — Z23 NEED FOR VACCINATION: ICD-10-CM

## 2021-03-11 ENCOUNTER — IMMUNIZATION (OUTPATIENT)
Dept: FAMILY PLANNING/WOMEN'S HEALTH CLINIC | Facility: IMMUNIZATION CENTER | Age: 76
End: 2021-03-11
Attending: INTERNAL MEDICINE
Payer: MEDICARE

## 2021-03-11 DIAGNOSIS — Z23 ENCOUNTER FOR VACCINATION: Primary | ICD-10-CM

## 2021-03-11 DIAGNOSIS — Z23 NEED FOR VACCINATION: ICD-10-CM

## 2021-03-11 PROCEDURE — 91300 PFIZER SARS-COV-2 VACCINE: CPT

## 2021-03-11 PROCEDURE — 0001A PFIZER SARS-COV-2 VACCINE: CPT

## 2021-04-02 ENCOUNTER — IMMUNIZATION (OUTPATIENT)
Dept: FAMILY PLANNING/WOMEN'S HEALTH CLINIC | Facility: IMMUNIZATION CENTER | Age: 76
End: 2021-04-02
Attending: INTERNAL MEDICINE
Payer: MEDICARE

## 2021-04-02 ENCOUNTER — HOSPITAL ENCOUNTER (OUTPATIENT)
Dept: RADIOLOGY | Facility: MEDICAL CENTER | Age: 76
End: 2021-04-02
Attending: FAMILY MEDICINE
Payer: MEDICARE

## 2021-04-02 DIAGNOSIS — M25.552 LEFT HIP PAIN: ICD-10-CM

## 2021-04-02 DIAGNOSIS — Z23 ENCOUNTER FOR VACCINATION: Primary | ICD-10-CM

## 2021-04-02 PROCEDURE — 0002A PFIZER SARS-COV-2 VACCINE: CPT

## 2021-04-02 PROCEDURE — 91300 PFIZER SARS-COV-2 VACCINE: CPT

## 2021-04-02 PROCEDURE — 73502 X-RAY EXAM HIP UNI 2-3 VIEWS: CPT | Mod: LT

## 2021-04-19 ENCOUNTER — HOSPITAL ENCOUNTER (OUTPATIENT)
Dept: RADIOLOGY | Facility: MEDICAL CENTER | Age: 76
End: 2021-04-19
Attending: FAMILY MEDICINE
Payer: MEDICARE

## 2021-04-19 DIAGNOSIS — M54.31 BILATERAL SCIATICA: ICD-10-CM

## 2021-04-19 DIAGNOSIS — M54.32 BILATERAL SCIATICA: ICD-10-CM

## 2021-04-19 PROCEDURE — 72148 MRI LUMBAR SPINE W/O DYE: CPT | Mod: MH

## 2021-09-22 ENCOUNTER — HOSPITAL ENCOUNTER (OUTPATIENT)
Dept: LAB | Facility: MEDICAL CENTER | Age: 76
End: 2021-09-22
Attending: FAMILY MEDICINE
Payer: MEDICARE

## 2021-09-22 LAB
ALBUMIN SERPL BCP-MCNC: 3.7 G/DL (ref 3.2–4.9)
ALBUMIN/GLOB SERPL: 1.2 G/DL
ALP SERPL-CCNC: 89 U/L (ref 30–99)
ALT SERPL-CCNC: 17 U/L (ref 2–50)
ANION GAP SERPL CALC-SCNC: 10 MMOL/L (ref 7–16)
AST SERPL-CCNC: 17 U/L (ref 12–45)
BASOPHILS # BLD AUTO: 0.6 % (ref 0–1.8)
BASOPHILS # BLD: 0.04 K/UL (ref 0–0.12)
BILIRUB SERPL-MCNC: 0.5 MG/DL (ref 0.1–1.5)
BUN SERPL-MCNC: 22 MG/DL (ref 8–22)
CALCIUM SERPL-MCNC: 9.3 MG/DL (ref 8.4–10.2)
CHLORIDE SERPL-SCNC: 106 MMOL/L (ref 96–112)
CHOLEST SERPL-MCNC: 173 MG/DL (ref 100–199)
CO2 SERPL-SCNC: 22 MMOL/L (ref 20–33)
CREAT SERPL-MCNC: 1.2 MG/DL (ref 0.5–1.4)
EOSINOPHIL # BLD AUTO: 0.34 K/UL (ref 0–0.51)
EOSINOPHIL NFR BLD: 5.4 % (ref 0–6.9)
ERYTHROCYTE [DISTWIDTH] IN BLOOD BY AUTOMATED COUNT: 43.3 FL (ref 35.9–50)
FASTING STATUS PATIENT QL REPORTED: NORMAL
GLOBULIN SER CALC-MCNC: 3.1 G/DL (ref 1.9–3.5)
GLUCOSE SERPL-MCNC: 100 MG/DL (ref 65–99)
HCT VFR BLD AUTO: 43.1 % (ref 42–52)
HDLC SERPL-MCNC: 62 MG/DL
HGB BLD-MCNC: 14.1 G/DL (ref 14–18)
IMM GRANULOCYTES # BLD AUTO: 0.02 K/UL (ref 0–0.11)
IMM GRANULOCYTES NFR BLD AUTO: 0.3 % (ref 0–0.9)
LDLC SERPL CALC-MCNC: 98 MG/DL
LYMPHOCYTES # BLD AUTO: 1.93 K/UL (ref 1–4.8)
LYMPHOCYTES NFR BLD: 30.9 % (ref 22–41)
MCH RBC QN AUTO: 31.4 PG (ref 27–33)
MCHC RBC AUTO-ENTMCNC: 32.7 G/DL (ref 33.7–35.3)
MCV RBC AUTO: 96 FL (ref 81.4–97.8)
MONOCYTES # BLD AUTO: 0.65 K/UL (ref 0–0.85)
MONOCYTES NFR BLD AUTO: 10.4 % (ref 0–13.4)
NEUTROPHILS # BLD AUTO: 3.26 K/UL (ref 1.82–7.42)
NEUTROPHILS NFR BLD: 52.4 % (ref 44–72)
NRBC # BLD AUTO: 0 K/UL
NRBC BLD-RTO: 0 /100 WBC
PLATELET # BLD AUTO: 211 K/UL (ref 164–446)
PMV BLD AUTO: 9.5 FL (ref 9–12.9)
POTASSIUM SERPL-SCNC: 4.4 MMOL/L (ref 3.6–5.5)
PROT SERPL-MCNC: 6.8 G/DL (ref 6–8.2)
PSA SERPL-MCNC: 3.73 NG/ML (ref 0–4)
RBC # BLD AUTO: 4.49 M/UL (ref 4.7–6.1)
SODIUM SERPL-SCNC: 138 MMOL/L (ref 135–145)
T4 FREE SERPL-MCNC: 0.95 NG/DL (ref 0.93–1.7)
TRIGL SERPL-MCNC: 65 MG/DL (ref 0–149)
TSH SERPL DL<=0.005 MIU/L-ACNC: 7.33 UIU/ML (ref 0.38–5.33)
WBC # BLD AUTO: 6.2 K/UL (ref 4.8–10.8)

## 2021-09-22 PROCEDURE — 84439 ASSAY OF FREE THYROXINE: CPT

## 2021-09-22 PROCEDURE — 80053 COMPREHEN METABOLIC PANEL: CPT

## 2021-09-22 PROCEDURE — 84443 ASSAY THYROID STIM HORMONE: CPT

## 2021-09-22 PROCEDURE — 36415 COLL VENOUS BLD VENIPUNCTURE: CPT

## 2021-09-22 PROCEDURE — 80061 LIPID PANEL: CPT

## 2021-09-22 PROCEDURE — 84153 ASSAY OF PSA TOTAL: CPT

## 2021-09-22 PROCEDURE — 85025 COMPLETE CBC W/AUTO DIFF WBC: CPT

## 2021-11-01 ENCOUNTER — HOSPITAL ENCOUNTER (OUTPATIENT)
Dept: RADIOLOGY | Facility: MEDICAL CENTER | Age: 76
End: 2021-11-01
Attending: FAMILY MEDICINE
Payer: MEDICARE

## 2021-11-01 DIAGNOSIS — E03.0 CONGENITAL HYPOTHYROIDISM WITH DIFFUSE GOITER: ICD-10-CM

## 2021-11-01 DIAGNOSIS — R29.898 WEAKNESS OF LEFT LEG: ICD-10-CM

## 2021-11-01 PROCEDURE — 76536 US EXAM OF HEAD AND NECK: CPT

## 2021-11-03 ENCOUNTER — HOSPITAL ENCOUNTER (OUTPATIENT)
Dept: RADIOLOGY | Facility: MEDICAL CENTER | Age: 76
End: 2021-11-03
Attending: FAMILY MEDICINE
Payer: MEDICARE

## 2021-11-03 DIAGNOSIS — R29.898 WEAKNESS OF LEFT LEG: ICD-10-CM

## 2021-11-03 PROCEDURE — 70551 MRI BRAIN STEM W/O DYE: CPT | Mod: MH

## 2021-11-03 PROCEDURE — 93922 UPR/L XTREMITY ART 2 LEVELS: CPT

## 2022-03-02 ENCOUNTER — HOSPITAL ENCOUNTER (OUTPATIENT)
Dept: LAB | Facility: MEDICAL CENTER | Age: 77
End: 2022-03-02
Attending: SURGERY
Payer: MEDICARE

## 2022-03-02 LAB — TSH SERPL DL<=0.005 MIU/L-ACNC: 0.03 UIU/ML (ref 0.38–5.33)

## 2022-03-02 PROCEDURE — 84443 ASSAY THYROID STIM HORMONE: CPT

## 2022-03-02 PROCEDURE — 36415 COLL VENOUS BLD VENIPUNCTURE: CPT

## 2022-03-23 ENCOUNTER — HOSPITAL ENCOUNTER (OUTPATIENT)
Facility: MEDICAL CENTER | Age: 77
End: 2022-03-23
Attending: FAMILY MEDICINE
Payer: MEDICARE

## 2022-03-23 ENCOUNTER — HOSPITAL ENCOUNTER (OUTPATIENT)
Dept: LAB | Facility: MEDICAL CENTER | Age: 77
End: 2022-03-23
Attending: FAMILY MEDICINE
Payer: MEDICARE

## 2022-03-23 PROCEDURE — 82272 OCCULT BLD FECES 1-3 TESTS: CPT

## 2022-03-24 ENCOUNTER — HOSPITAL ENCOUNTER (OUTPATIENT)
Facility: MEDICAL CENTER | Age: 77
End: 2022-03-24
Attending: FAMILY MEDICINE
Payer: MEDICARE

## 2022-03-24 PROCEDURE — 82272 OCCULT BLD FECES 1-3 TESTS: CPT

## 2022-03-25 ENCOUNTER — HOSPITAL ENCOUNTER (OUTPATIENT)
Facility: MEDICAL CENTER | Age: 77
End: 2022-03-25
Attending: FAMILY MEDICINE
Payer: MEDICARE

## 2022-03-25 LAB
G LAMBLIA+C PARVUM AG STL QL RAPID: NORMAL
HEMOCCULT STL QL: NEGATIVE
SIGNIFICANT IND 70042: NORMAL
SITE SITE: NORMAL
SOURCE SOURCE: NORMAL

## 2022-03-25 PROCEDURE — 82272 OCCULT BLD FECES 1-3 TESTS: CPT

## 2022-03-25 PROCEDURE — 87493 C DIFF AMPLIFIED PROBE: CPT

## 2022-03-25 PROCEDURE — 87425 ROTAVIRUS AG IA: CPT

## 2022-03-25 PROCEDURE — 87328 CRYPTOSPORIDIUM AG IA: CPT

## 2022-03-25 PROCEDURE — 87045 FECES CULTURE AEROBIC BACT: CPT

## 2022-03-25 PROCEDURE — 87899 AGENT NOS ASSAY W/OPTIC: CPT

## 2022-03-25 PROCEDURE — 87329 GIARDIA AG IA: CPT

## 2022-03-26 LAB
C DIFF DNA SPEC QL NAA+PROBE: NEGATIVE
C DIFF TOX GENS STL QL NAA+PROBE: NEGATIVE
E COLI SXT1+2 STL IA: NORMAL
RV AG STL QL IA: NORMAL
SIGNIFICANT IND 70042: NORMAL
SIGNIFICANT IND 70042: NORMAL
SITE SITE: NORMAL
SITE SITE: NORMAL
SOURCE SOURCE: NORMAL
SOURCE SOURCE: NORMAL

## 2022-03-27 LAB
BACTERIA STL CULT: NORMAL
C JEJUNI+C COLI AG STL QL: NORMAL
E COLI SXT1+2 STL IA: NORMAL
SIGNIFICANT IND 70042: NORMAL
SITE SITE: NORMAL
SOURCE SOURCE: NORMAL

## 2022-05-20 ENCOUNTER — HOSPITAL ENCOUNTER (OUTPATIENT)
Dept: LAB | Facility: MEDICAL CENTER | Age: 77
End: 2022-05-20
Attending: SURGERY
Payer: MEDICARE

## 2022-05-20 LAB — TSH SERPL DL<=0.005 MIU/L-ACNC: 0.03 UIU/ML (ref 0.38–5.33)

## 2022-05-20 PROCEDURE — 84443 ASSAY THYROID STIM HORMONE: CPT

## 2022-05-20 PROCEDURE — 36415 COLL VENOUS BLD VENIPUNCTURE: CPT

## 2022-06-20 ENCOUNTER — TELEPHONE (OUTPATIENT)
Dept: HEALTH INFORMATION MANAGEMENT | Facility: OTHER | Age: 77
End: 2022-06-20
Payer: MEDICARE

## 2022-06-20 NOTE — TELEPHONE ENCOUNTER
2nd attempt- Verified HIPAA- called to schedule COMPREHENSIVE HEALTH ASSESSMENT. Scheduled for next week, member asked about that was being assessed and how the information would be used.   Reviewed the info from FAQ sheet:  Appointment overview:  * Medical assistant will room patient and complete portions of the health questionnaire and non-invasive diagnostic tests that screen for common but often overlooked health risks (10-20 minutes)  o Quanta[employer]lo PAD test: Pulse oximeter on toe to assess peripheral artery disease  o NC-stat: Ankle cuff to assess peripheral neuropathy  * Practitioner spends remaining time with patient going over health questionnaire (40-50 minutes)  o Not treating anything that day, no prescriptions, no referrals  o Health questionnaire covers: mind and memory, mobility and physical activity, medication review  o Practitioner covers patient questions and concerns  * Results go back to PCP Added to Media tab in Epic   o PCP will make final decisions  o Then they want the PCP to act on these risks to mitigate problems to prevent Medicare from paying for problems that could have been avoided.

## 2022-06-28 PROBLEM — E66.9 OBESITY (BMI 30-39.9): Status: ACTIVE | Noted: 2022-06-28

## 2022-06-28 PROBLEM — G31.9 CEREBRAL ATROPHY (HCC): Status: ACTIVE | Noted: 2022-06-28

## 2022-09-23 ENCOUNTER — HOSPITAL ENCOUNTER (OUTPATIENT)
Dept: LAB | Facility: MEDICAL CENTER | Age: 77
End: 2022-09-23
Attending: FAMILY MEDICINE
Payer: MEDICARE

## 2022-09-23 LAB
ALBUMIN SERPL BCP-MCNC: 4.1 G/DL (ref 3.2–4.9)
ALBUMIN/GLOB SERPL: 1.5 G/DL
ALP SERPL-CCNC: 82 U/L (ref 30–99)
ALT SERPL-CCNC: 12 U/L (ref 2–50)
ANION GAP SERPL CALC-SCNC: 10 MMOL/L (ref 7–16)
AST SERPL-CCNC: 17 U/L (ref 12–45)
BASOPHILS # BLD AUTO: 0.7 % (ref 0–1.8)
BASOPHILS # BLD: 0.05 K/UL (ref 0–0.12)
BILIRUB SERPL-MCNC: 0.7 MG/DL (ref 0.1–1.5)
BUN SERPL-MCNC: 29 MG/DL (ref 8–22)
CALCIUM SERPL-MCNC: 9.2 MG/DL (ref 8.4–10.2)
CHLORIDE SERPL-SCNC: 108 MMOL/L (ref 96–112)
CHOLEST SERPL-MCNC: 184 MG/DL (ref 100–199)
CO2 SERPL-SCNC: 23 MMOL/L (ref 20–33)
CREAT SERPL-MCNC: 1.16 MG/DL (ref 0.5–1.4)
EOSINOPHIL # BLD AUTO: 0.44 K/UL (ref 0–0.51)
EOSINOPHIL NFR BLD: 6.3 % (ref 0–6.9)
ERYTHROCYTE [DISTWIDTH] IN BLOOD BY AUTOMATED COUNT: 42.4 FL (ref 35.9–50)
FASTING STATUS PATIENT QL REPORTED: NORMAL
GFR SERPLBLD CREATININE-BSD FMLA CKD-EPI: 65 ML/MIN/1.73 M 2
GLOBULIN SER CALC-MCNC: 2.8 G/DL (ref 1.9–3.5)
GLUCOSE SERPL-MCNC: 89 MG/DL (ref 65–99)
HCT VFR BLD AUTO: 43 % (ref 42–52)
HDLC SERPL-MCNC: 60 MG/DL
HGB BLD-MCNC: 14.1 G/DL (ref 14–18)
IMM GRANULOCYTES # BLD AUTO: 0.03 K/UL (ref 0–0.11)
IMM GRANULOCYTES NFR BLD AUTO: 0.4 % (ref 0–0.9)
LDLC SERPL CALC-MCNC: 109 MG/DL
LYMPHOCYTES # BLD AUTO: 1.89 K/UL (ref 1–4.8)
LYMPHOCYTES NFR BLD: 27.2 % (ref 22–41)
MCH RBC QN AUTO: 31.3 PG (ref 27–33)
MCHC RBC AUTO-ENTMCNC: 32.8 G/DL (ref 33.7–35.3)
MCV RBC AUTO: 95.3 FL (ref 81.4–97.8)
MONOCYTES # BLD AUTO: 0.73 K/UL (ref 0–0.85)
MONOCYTES NFR BLD AUTO: 10.5 % (ref 0–13.4)
NEUTROPHILS # BLD AUTO: 3.8 K/UL (ref 1.82–7.42)
NEUTROPHILS NFR BLD: 54.9 % (ref 44–72)
NRBC # BLD AUTO: 0 K/UL
NRBC BLD-RTO: 0 /100 WBC
PLATELET # BLD AUTO: 204 K/UL (ref 164–446)
PMV BLD AUTO: 10.4 FL (ref 9–12.9)
POTASSIUM SERPL-SCNC: 4.2 MMOL/L (ref 3.6–5.5)
PROT SERPL-MCNC: 6.9 G/DL (ref 6–8.2)
PSA SERPL-MCNC: 3.86 NG/ML (ref 0–4)
RBC # BLD AUTO: 4.51 M/UL (ref 4.7–6.1)
SODIUM SERPL-SCNC: 141 MMOL/L (ref 135–145)
TRIGL SERPL-MCNC: 77 MG/DL (ref 0–149)
TSH SERPL DL<=0.005 MIU/L-ACNC: 5.28 UIU/ML (ref 0.38–5.33)
WBC # BLD AUTO: 6.9 K/UL (ref 4.8–10.8)

## 2022-09-23 PROCEDURE — 84153 ASSAY OF PSA TOTAL: CPT

## 2022-09-23 PROCEDURE — 80053 COMPREHEN METABOLIC PANEL: CPT

## 2022-09-23 PROCEDURE — 80061 LIPID PANEL: CPT

## 2022-09-23 PROCEDURE — 85025 COMPLETE CBC W/AUTO DIFF WBC: CPT

## 2022-09-23 PROCEDURE — 84443 ASSAY THYROID STIM HORMONE: CPT

## 2022-09-23 PROCEDURE — 36415 COLL VENOUS BLD VENIPUNCTURE: CPT

## 2022-11-28 ENCOUNTER — HOSPITAL ENCOUNTER (OUTPATIENT)
Dept: RADIOLOGY | Facility: MEDICAL CENTER | Age: 77
End: 2022-11-28
Attending: SURGERY
Payer: MEDICARE

## 2022-11-28 DIAGNOSIS — E04.2 NONTOXIC MULTINODULAR GOITER: ICD-10-CM

## 2022-11-28 PROCEDURE — 76536 US EXAM OF HEAD AND NECK: CPT

## 2022-12-23 ENCOUNTER — DOCUMENTATION (OUTPATIENT)
Dept: HEALTH INFORMATION MANAGEMENT | Facility: OTHER | Age: 77
End: 2022-12-23
Payer: MEDICARE

## 2023-04-17 ENCOUNTER — HOSPITAL ENCOUNTER (OUTPATIENT)
Dept: LAB | Facility: MEDICAL CENTER | Age: 78
End: 2023-04-17
Attending: FAMILY MEDICINE
Payer: COMMERCIAL

## 2023-04-17 LAB
ALBUMIN SERPL BCP-MCNC: 4.1 G/DL (ref 3.2–4.9)
ALBUMIN/GLOB SERPL: 1.3 G/DL
ALP SERPL-CCNC: 100 U/L (ref 30–99)
ALT SERPL-CCNC: 14 U/L (ref 2–50)
ANION GAP SERPL CALC-SCNC: 10 MMOL/L (ref 7–16)
AST SERPL-CCNC: 20 U/L (ref 12–45)
BASOPHILS # BLD AUTO: 0.7 % (ref 0–1.8)
BASOPHILS # BLD: 0.05 K/UL (ref 0–0.12)
BILIRUB SERPL-MCNC: 0.3 MG/DL (ref 0.1–1.5)
BUN SERPL-MCNC: 23 MG/DL (ref 8–22)
CALCIUM ALBUM COR SERPL-MCNC: 9.6 MG/DL (ref 8.5–10.5)
CALCIUM SERPL-MCNC: 9.7 MG/DL (ref 8.4–10.2)
CHLORIDE SERPL-SCNC: 107 MMOL/L (ref 96–112)
CO2 SERPL-SCNC: 26 MMOL/L (ref 20–33)
CREAT SERPL-MCNC: 1.23 MG/DL (ref 0.5–1.4)
EOSINOPHIL # BLD AUTO: 0.25 K/UL (ref 0–0.51)
EOSINOPHIL NFR BLD: 3.7 % (ref 0–6.9)
ERYTHROCYTE [DISTWIDTH] IN BLOOD BY AUTOMATED COUNT: 40.5 FL (ref 35.9–50)
GFR SERPLBLD CREATININE-BSD FMLA CKD-EPI: 60 ML/MIN/1.73 M 2
GLOBULIN SER CALC-MCNC: 3.2 G/DL (ref 1.9–3.5)
GLUCOSE SERPL-MCNC: 100 MG/DL (ref 65–99)
HCT VFR BLD AUTO: 41.5 % (ref 42–52)
HGB BLD-MCNC: 13.8 G/DL (ref 14–18)
IMM GRANULOCYTES # BLD AUTO: 0.02 K/UL (ref 0–0.11)
IMM GRANULOCYTES NFR BLD AUTO: 0.3 % (ref 0–0.9)
LYMPHOCYTES # BLD AUTO: 1.43 K/UL (ref 1–4.8)
LYMPHOCYTES NFR BLD: 21 % (ref 22–41)
MCH RBC QN AUTO: 31.2 PG (ref 27–33)
MCHC RBC AUTO-ENTMCNC: 33.3 G/DL (ref 33.7–35.3)
MCV RBC AUTO: 93.7 FL (ref 81.4–97.8)
MONOCYTES # BLD AUTO: 0.56 K/UL (ref 0–0.85)
MONOCYTES NFR BLD AUTO: 8.2 % (ref 0–13.4)
NEUTROPHILS # BLD AUTO: 4.51 K/UL (ref 1.82–7.42)
NEUTROPHILS NFR BLD: 66.1 % (ref 44–72)
NRBC # BLD AUTO: 0 K/UL
NRBC BLD-RTO: 0 /100 WBC
PLATELET # BLD AUTO: 240 K/UL (ref 164–446)
PMV BLD AUTO: 10.6 FL (ref 9–12.9)
POTASSIUM SERPL-SCNC: 4 MMOL/L (ref 3.6–5.5)
PROT SERPL-MCNC: 7.3 G/DL (ref 6–8.2)
RBC # BLD AUTO: 4.43 M/UL (ref 4.7–6.1)
SODIUM SERPL-SCNC: 143 MMOL/L (ref 135–145)
TSH SERPL DL<=0.005 MIU/L-ACNC: 6.35 UIU/ML (ref 0.38–5.33)
WBC # BLD AUTO: 6.8 K/UL (ref 4.8–10.8)

## 2023-04-17 PROCEDURE — 85025 COMPLETE CBC W/AUTO DIFF WBC: CPT

## 2023-04-17 PROCEDURE — 84443 ASSAY THYROID STIM HORMONE: CPT

## 2023-04-17 PROCEDURE — 36415 COLL VENOUS BLD VENIPUNCTURE: CPT

## 2023-04-17 PROCEDURE — 80053 COMPREHEN METABOLIC PANEL: CPT

## 2023-04-18 ENCOUNTER — HOSPITAL ENCOUNTER (OUTPATIENT)
Facility: MEDICAL CENTER | Age: 78
End: 2023-04-18
Attending: FAMILY MEDICINE
Payer: COMMERCIAL

## 2023-04-18 LAB
C DIFF DNA SPEC QL NAA+PROBE: NEGATIVE
C DIFF TOX GENS STL QL NAA+PROBE: NEGATIVE
G LAMBLIA+C PARVUM AG STL QL RAPID: NORMAL
SIGNIFICANT IND 70042: NORMAL
SITE SITE: NORMAL
SOURCE SOURCE: NORMAL

## 2023-04-18 PROCEDURE — 87045 FECES CULTURE AEROBIC BACT: CPT

## 2023-04-18 PROCEDURE — 87899 AGENT NOS ASSAY W/OPTIC: CPT | Mod: 91

## 2023-04-18 PROCEDURE — 87329 GIARDIA AG IA: CPT

## 2023-04-18 PROCEDURE — 87328 CRYPTOSPORIDIUM AG IA: CPT

## 2023-04-18 PROCEDURE — 83993 ASSAY FOR CALPROTECTIN FECAL: CPT

## 2023-04-18 PROCEDURE — 87493 C DIFF AMPLIFIED PROBE: CPT

## 2023-04-19 LAB
E COLI SXT1+2 STL IA: NORMAL
SIGNIFICANT IND 70042: NORMAL
SITE SITE: NORMAL
SOURCE SOURCE: NORMAL

## 2023-04-21 LAB — CALPROTECTIN STL-MCNT: 78 UG/G

## 2023-07-19 ENCOUNTER — HOSPITAL ENCOUNTER (OUTPATIENT)
Dept: LAB | Facility: MEDICAL CENTER | Age: 78
End: 2023-07-19
Attending: FAMILY MEDICINE
Payer: COMMERCIAL

## 2023-07-19 LAB — TSH SERPL DL<=0.005 MIU/L-ACNC: 1.09 UIU/ML (ref 0.38–5.33)

## 2023-07-19 PROCEDURE — 36415 COLL VENOUS BLD VENIPUNCTURE: CPT

## 2023-07-19 PROCEDURE — 84443 ASSAY THYROID STIM HORMONE: CPT

## 2023-09-26 ENCOUNTER — HOSPITAL ENCOUNTER (OUTPATIENT)
Dept: LAB | Facility: MEDICAL CENTER | Age: 78
End: 2023-09-26
Attending: FAMILY MEDICINE
Payer: COMMERCIAL

## 2023-09-26 LAB
ALBUMIN SERPL BCP-MCNC: 4.1 G/DL (ref 3.2–4.9)
ALBUMIN/GLOB SERPL: 1.4 G/DL
ALP SERPL-CCNC: 82 U/L (ref 30–99)
ALT SERPL-CCNC: 14 U/L (ref 2–50)
ANION GAP SERPL CALC-SCNC: 12 MMOL/L (ref 7–16)
AST SERPL-CCNC: 19 U/L (ref 12–45)
BASOPHILS # BLD AUTO: 0.9 % (ref 0–1.8)
BASOPHILS # BLD: 0.05 K/UL (ref 0–0.12)
BILIRUB SERPL-MCNC: 0.8 MG/DL (ref 0.1–1.5)
BUN SERPL-MCNC: 26 MG/DL (ref 8–22)
CALCIUM ALBUM COR SERPL-MCNC: 9 MG/DL (ref 8.5–10.5)
CALCIUM SERPL-MCNC: 9.1 MG/DL (ref 8.4–10.2)
CHLORIDE SERPL-SCNC: 109 MMOL/L (ref 96–112)
CHOLEST SERPL-MCNC: 161 MG/DL (ref 100–199)
CO2 SERPL-SCNC: 22 MMOL/L (ref 20–33)
CREAT SERPL-MCNC: 1.23 MG/DL (ref 0.5–1.4)
EOSINOPHIL # BLD AUTO: 0.29 K/UL (ref 0–0.51)
EOSINOPHIL NFR BLD: 5.2 % (ref 0–6.9)
ERYTHROCYTE [DISTWIDTH] IN BLOOD BY AUTOMATED COUNT: 42.2 FL (ref 35.9–50)
FASTING STATUS PATIENT QL REPORTED: NORMAL
GFR SERPLBLD CREATININE-BSD FMLA CKD-EPI: 60 ML/MIN/1.73 M 2
GLOBULIN SER CALC-MCNC: 2.9 G/DL (ref 1.9–3.5)
GLUCOSE SERPL-MCNC: 95 MG/DL (ref 65–99)
HCT VFR BLD AUTO: 44.1 % (ref 42–52)
HDLC SERPL-MCNC: 56 MG/DL
HGB BLD-MCNC: 14.6 G/DL (ref 14–18)
IMM GRANULOCYTES # BLD AUTO: 0.01 K/UL (ref 0–0.11)
IMM GRANULOCYTES NFR BLD AUTO: 0.2 % (ref 0–0.9)
LDLC SERPL CALC-MCNC: 89 MG/DL
LYMPHOCYTES # BLD AUTO: 1.7 K/UL (ref 1–4.8)
LYMPHOCYTES NFR BLD: 30.2 % (ref 22–41)
MCH RBC QN AUTO: 31.2 PG (ref 27–33)
MCHC RBC AUTO-ENTMCNC: 33.1 G/DL (ref 32.3–36.5)
MCV RBC AUTO: 94.2 FL (ref 81.4–97.8)
MONOCYTES # BLD AUTO: 0.48 K/UL (ref 0–0.85)
MONOCYTES NFR BLD AUTO: 8.5 % (ref 0–13.4)
NEUTROPHILS # BLD AUTO: 3.1 K/UL (ref 1.82–7.42)
NEUTROPHILS NFR BLD: 55 % (ref 44–72)
NRBC # BLD AUTO: 0 K/UL
NRBC BLD-RTO: 0 /100 WBC (ref 0–0.2)
PLATELET # BLD AUTO: 199 K/UL (ref 164–446)
PMV BLD AUTO: 10.5 FL (ref 9–12.9)
POTASSIUM SERPL-SCNC: 4.5 MMOL/L (ref 3.6–5.5)
PROT SERPL-MCNC: 7 G/DL (ref 6–8.2)
RBC # BLD AUTO: 4.68 M/UL (ref 4.7–6.1)
SODIUM SERPL-SCNC: 143 MMOL/L (ref 135–145)
T4 FREE SERPL-MCNC: 1.44 NG/DL (ref 0.93–1.7)
TRIGL SERPL-MCNC: 81 MG/DL (ref 0–149)
TSH SERPL DL<=0.005 MIU/L-ACNC: 0.8 UIU/ML (ref 0.38–5.33)
WBC # BLD AUTO: 5.6 K/UL (ref 4.8–10.8)

## 2023-09-26 PROCEDURE — 80053 COMPREHEN METABOLIC PANEL: CPT

## 2023-09-26 PROCEDURE — 80061 LIPID PANEL: CPT

## 2023-09-26 PROCEDURE — 84443 ASSAY THYROID STIM HORMONE: CPT

## 2023-09-26 PROCEDURE — 85025 COMPLETE CBC W/AUTO DIFF WBC: CPT

## 2023-09-26 PROCEDURE — 84439 ASSAY OF FREE THYROXINE: CPT

## 2023-09-26 PROCEDURE — 36415 COLL VENOUS BLD VENIPUNCTURE: CPT

## 2023-11-17 ENCOUNTER — APPOINTMENT (OUTPATIENT)
Dept: RADIOLOGY | Facility: MEDICAL CENTER | Age: 78
End: 2023-11-17
Attending: SURGERY
Payer: COMMERCIAL

## 2023-11-20 ENCOUNTER — HOSPITAL ENCOUNTER (OUTPATIENT)
Dept: RADIOLOGY | Facility: MEDICAL CENTER | Age: 78
End: 2023-11-20
Attending: SURGERY
Payer: COMMERCIAL

## 2023-11-20 DIAGNOSIS — E04.2 NONTOXIC MULTINODULAR GOITER: ICD-10-CM

## 2023-11-20 PROCEDURE — 76536 US EXAM OF HEAD AND NECK: CPT

## 2024-09-12 ENCOUNTER — HOSPITAL ENCOUNTER (OUTPATIENT)
Dept: LAB | Facility: MEDICAL CENTER | Age: 79
End: 2024-09-12
Attending: FAMILY MEDICINE
Payer: COMMERCIAL

## 2024-09-12 LAB
ALBUMIN SERPL BCP-MCNC: 3.8 G/DL (ref 3.2–4.9)
ALBUMIN/GLOB SERPL: 1.4 G/DL
ALP SERPL-CCNC: 95 U/L (ref 30–99)
ALT SERPL-CCNC: 31 U/L (ref 2–50)
ANION GAP SERPL CALC-SCNC: 14 MMOL/L (ref 7–16)
AST SERPL-CCNC: 27 U/L (ref 12–45)
BASOPHILS # BLD AUTO: 0.3 % (ref 0–1.8)
BASOPHILS # BLD: 0.02 K/UL (ref 0–0.12)
BILIRUB SERPL-MCNC: 0.6 MG/DL (ref 0.1–1.5)
BUN SERPL-MCNC: 23 MG/DL (ref 8–22)
CALCIUM ALBUM COR SERPL-MCNC: 8.8 MG/DL (ref 8.5–10.5)
CALCIUM SERPL-MCNC: 8.6 MG/DL (ref 8.4–10.2)
CHLORIDE SERPL-SCNC: 108 MMOL/L (ref 96–112)
CHOLEST SERPL-MCNC: 103 MG/DL (ref 100–199)
CO2 SERPL-SCNC: 20 MMOL/L (ref 20–33)
CREAT SERPL-MCNC: 1.22 MG/DL (ref 0.5–1.4)
EOSINOPHIL # BLD AUTO: 0.25 K/UL (ref 0–0.51)
EOSINOPHIL NFR BLD: 4.1 % (ref 0–6.9)
ERYTHROCYTE [DISTWIDTH] IN BLOOD BY AUTOMATED COUNT: 45.5 FL (ref 35.9–50)
FASTING STATUS PATIENT QL REPORTED: NORMAL
GFR SERPLBLD CREATININE-BSD FMLA CKD-EPI: 60 ML/MIN/1.73 M 2
GLOBULIN SER CALC-MCNC: 2.7 G/DL (ref 1.9–3.5)
GLUCOSE SERPL-MCNC: 96 MG/DL (ref 65–99)
HCT VFR BLD AUTO: 39.9 % (ref 42–52)
HDLC SERPL-MCNC: 37 MG/DL
HGB BLD-MCNC: 13 G/DL (ref 14–18)
IMM GRANULOCYTES # BLD AUTO: 0.02 K/UL (ref 0–0.11)
IMM GRANULOCYTES NFR BLD AUTO: 0.3 % (ref 0–0.9)
LDLC SERPL CALC-MCNC: 51 MG/DL
LYMPHOCYTES # BLD AUTO: 1.54 K/UL (ref 1–4.8)
LYMPHOCYTES NFR BLD: 25 % (ref 22–41)
MCH RBC QN AUTO: 31.7 PG (ref 27–33)
MCHC RBC AUTO-ENTMCNC: 32.6 G/DL (ref 32.3–36.5)
MCV RBC AUTO: 97.3 FL (ref 81.4–97.8)
MONOCYTES # BLD AUTO: 0.62 K/UL (ref 0–0.85)
MONOCYTES NFR BLD AUTO: 10 % (ref 0–13.4)
NEUTROPHILS # BLD AUTO: 3.72 K/UL (ref 1.82–7.42)
NEUTROPHILS NFR BLD: 60.3 % (ref 44–72)
NRBC # BLD AUTO: 0 K/UL
NRBC BLD-RTO: 0 /100 WBC (ref 0–0.2)
PLATELET # BLD AUTO: 159 K/UL (ref 164–446)
PMV BLD AUTO: 10.8 FL (ref 9–12.9)
POTASSIUM SERPL-SCNC: 4.1 MMOL/L (ref 3.6–5.5)
PROT SERPL-MCNC: 6.5 G/DL (ref 6–8.2)
PSA SERPL-MCNC: 3.36 NG/ML (ref 0–4)
RBC # BLD AUTO: 4.1 M/UL (ref 4.7–6.1)
SODIUM SERPL-SCNC: 142 MMOL/L (ref 135–145)
T4 FREE SERPL-MCNC: 1.01 NG/DL (ref 0.93–1.7)
TRIGL SERPL-MCNC: 73 MG/DL (ref 0–149)
TSH SERPL DL<=0.005 MIU/L-ACNC: 5.55 UIU/ML (ref 0.38–5.33)
WBC # BLD AUTO: 6.2 K/UL (ref 4.8–10.8)

## 2024-09-12 PROCEDURE — 80061 LIPID PANEL: CPT

## 2024-09-12 PROCEDURE — 84439 ASSAY OF FREE THYROXINE: CPT

## 2024-09-12 PROCEDURE — 84153 ASSAY OF PSA TOTAL: CPT

## 2024-09-12 PROCEDURE — 84443 ASSAY THYROID STIM HORMONE: CPT

## 2024-09-12 PROCEDURE — 36415 COLL VENOUS BLD VENIPUNCTURE: CPT

## 2024-09-12 PROCEDURE — 80053 COMPREHEN METABOLIC PANEL: CPT

## 2024-09-12 PROCEDURE — 85025 COMPLETE CBC W/AUTO DIFF WBC: CPT

## 2024-11-22 ENCOUNTER — HOSPITAL ENCOUNTER (OUTPATIENT)
Dept: LAB | Facility: MEDICAL CENTER | Age: 79
End: 2024-11-22
Attending: FAMILY MEDICINE
Payer: COMMERCIAL

## 2024-11-22 LAB
ALBUMIN SERPL BCP-MCNC: 3.8 G/DL (ref 3.2–4.9)
ALBUMIN/GLOB SERPL: 1.5 G/DL
ALP SERPL-CCNC: 100 U/L (ref 30–99)
ALT SERPL-CCNC: 28 U/L (ref 2–50)
ANION GAP SERPL CALC-SCNC: 9 MMOL/L (ref 7–16)
AST SERPL-CCNC: 29 U/L (ref 12–45)
BASOPHILS # BLD AUTO: 0.5 % (ref 0–1.8)
BASOPHILS # BLD: 0.03 K/UL (ref 0–0.12)
BILIRUB SERPL-MCNC: 0.5 MG/DL (ref 0.1–1.5)
BUN SERPL-MCNC: 17 MG/DL (ref 8–22)
CALCIUM ALBUM COR SERPL-MCNC: 8.6 MG/DL (ref 8.5–10.5)
CALCIUM SERPL-MCNC: 8.4 MG/DL (ref 8.4–10.2)
CHLORIDE SERPL-SCNC: 115 MMOL/L (ref 96–112)
CO2 SERPL-SCNC: 21 MMOL/L (ref 20–33)
CREAT SERPL-MCNC: 1.23 MG/DL (ref 0.5–1.4)
EOSINOPHIL # BLD AUTO: 0.2 K/UL (ref 0–0.51)
EOSINOPHIL NFR BLD: 3.1 % (ref 0–6.9)
ERYTHROCYTE [DISTWIDTH] IN BLOOD BY AUTOMATED COUNT: 46.2 FL (ref 35.9–50)
FASTING STATUS PATIENT QL REPORTED: NORMAL
GFR SERPLBLD CREATININE-BSD FMLA CKD-EPI: 60 ML/MIN/1.73 M 2
GLOBULIN SER CALC-MCNC: 2.6 G/DL (ref 1.9–3.5)
GLUCOSE SERPL-MCNC: 77 MG/DL (ref 65–99)
HCT VFR BLD AUTO: 38.1 % (ref 42–52)
HGB BLD-MCNC: 12.5 G/DL (ref 14–18)
IMM GRANULOCYTES # BLD AUTO: 0.02 K/UL (ref 0–0.11)
IMM GRANULOCYTES NFR BLD AUTO: 0.3 % (ref 0–0.9)
LYMPHOCYTES # BLD AUTO: 1.68 K/UL (ref 1–4.8)
LYMPHOCYTES NFR BLD: 25.9 % (ref 22–41)
MCH RBC QN AUTO: 32.1 PG (ref 27–33)
MCHC RBC AUTO-ENTMCNC: 32.8 G/DL (ref 32.3–36.5)
MCV RBC AUTO: 97.9 FL (ref 81.4–97.8)
MONOCYTES # BLD AUTO: 0.61 K/UL (ref 0–0.85)
MONOCYTES NFR BLD AUTO: 9.4 % (ref 0–13.4)
NEUTROPHILS # BLD AUTO: 3.94 K/UL (ref 1.82–7.42)
NEUTROPHILS NFR BLD: 60.8 % (ref 44–72)
NRBC # BLD AUTO: 0 K/UL
NRBC BLD-RTO: 0 /100 WBC (ref 0–0.2)
PLATELET # BLD AUTO: 143 K/UL (ref 164–446)
PMV BLD AUTO: 11.6 FL (ref 9–12.9)
POTASSIUM SERPL-SCNC: 3.9 MMOL/L (ref 3.6–5.5)
PROT SERPL-MCNC: 6.4 G/DL (ref 6–8.2)
RBC # BLD AUTO: 3.89 M/UL (ref 4.7–6.1)
SODIUM SERPL-SCNC: 145 MMOL/L (ref 135–145)
TSH SERPL DL<=0.005 MIU/L-ACNC: 7.63 UIU/ML (ref 0.38–5.33)
WBC # BLD AUTO: 6.5 K/UL (ref 4.8–10.8)

## 2024-11-22 PROCEDURE — 80053 COMPREHEN METABOLIC PANEL: CPT

## 2024-11-22 PROCEDURE — 84443 ASSAY THYROID STIM HORMONE: CPT

## 2024-11-22 PROCEDURE — 84439 ASSAY OF FREE THYROXINE: CPT

## 2024-11-22 PROCEDURE — 85025 COMPLETE CBC W/AUTO DIFF WBC: CPT

## 2024-11-22 PROCEDURE — 36415 COLL VENOUS BLD VENIPUNCTURE: CPT

## 2024-11-25 LAB — T4 FREE SERPL-MCNC: 1.02 NG/DL (ref 0.93–1.7)

## 2024-12-16 ENCOUNTER — HOSPITAL ENCOUNTER (OUTPATIENT)
Dept: LAB | Facility: MEDICAL CENTER | Age: 79
End: 2024-12-16
Attending: FAMILY MEDICINE
Payer: COMMERCIAL

## 2024-12-16 LAB
ALBUMIN SERPL BCP-MCNC: 4 G/DL (ref 3.2–4.9)
ALBUMIN/GLOB SERPL: 1.4 G/DL
ALP SERPL-CCNC: 98 U/L (ref 30–99)
ALT SERPL-CCNC: 31 U/L (ref 2–50)
ANION GAP SERPL CALC-SCNC: 13 MMOL/L (ref 7–16)
APTT PPP: 36 SEC (ref 24.7–36)
AST SERPL-CCNC: 30 U/L (ref 12–45)
BASOPHILS # BLD AUTO: 0.5 % (ref 0–1.8)
BASOPHILS # BLD: 0.03 K/UL (ref 0–0.12)
BILIRUB SERPL-MCNC: 0.4 MG/DL (ref 0.1–1.5)
BUN SERPL-MCNC: 26 MG/DL (ref 8–22)
CALCIUM ALBUM COR SERPL-MCNC: 8.3 MG/DL (ref 8.5–10.5)
CALCIUM SERPL-MCNC: 8.3 MG/DL (ref 8.4–10.2)
CHLORIDE SERPL-SCNC: 111 MMOL/L (ref 96–112)
CO2 SERPL-SCNC: 20 MMOL/L (ref 20–33)
CREAT SERPL-MCNC: 1.13 MG/DL (ref 0.5–1.4)
EOSINOPHIL # BLD AUTO: 0.19 K/UL (ref 0–0.51)
EOSINOPHIL NFR BLD: 3.4 % (ref 0–6.9)
ERYTHROCYTE [DISTWIDTH] IN BLOOD BY AUTOMATED COUNT: 45.2 FL (ref 35.9–50)
FERRITIN SERPL-MCNC: 63 NG/ML (ref 22–322)
FOLATE SERPL-MCNC: 32.3 NG/ML
GFR SERPLBLD CREATININE-BSD FMLA CKD-EPI: 66 ML/MIN/1.73 M 2
GLOBULIN SER CALC-MCNC: 2.9 G/DL (ref 1.9–3.5)
GLUCOSE SERPL-MCNC: 86 MG/DL (ref 65–99)
HCT VFR BLD AUTO: 41.2 % (ref 42–52)
HGB BLD-MCNC: 13.4 G/DL (ref 14–18)
HGB RETIC QN AUTO: 36.3 PG/CELL (ref 29–35)
IMM GRANULOCYTES # BLD AUTO: 0.02 K/UL (ref 0–0.11)
IMM GRANULOCYTES NFR BLD AUTO: 0.4 % (ref 0–0.9)
IMM RETICS NFR: 12.5 % (ref 2.6–16.1)
INR PPP: 1.19 (ref 0.87–1.13)
IRON SATN MFR SERPL: 21 % (ref 15–55)
IRON SERPL-MCNC: 51 UG/DL (ref 50–180)
LYMPHOCYTES # BLD AUTO: 1.54 K/UL (ref 1–4.8)
LYMPHOCYTES NFR BLD: 27.4 % (ref 22–41)
MCH RBC QN AUTO: 31.8 PG (ref 27–33)
MCHC RBC AUTO-ENTMCNC: 32.5 G/DL (ref 32.3–36.5)
MCV RBC AUTO: 97.6 FL (ref 81.4–97.8)
MONOCYTES # BLD AUTO: 0.55 K/UL (ref 0–0.85)
MONOCYTES NFR BLD AUTO: 9.8 % (ref 0–13.4)
NEUTROPHILS # BLD AUTO: 3.29 K/UL (ref 1.82–7.42)
NEUTROPHILS NFR BLD: 58.5 % (ref 44–72)
NRBC # BLD AUTO: 0 K/UL
NRBC BLD-RTO: 0 /100 WBC (ref 0–0.2)
PLATELET # BLD AUTO: 174 K/UL (ref 164–446)
PMV BLD AUTO: 11.5 FL (ref 9–12.9)
POTASSIUM SERPL-SCNC: 3.4 MMOL/L (ref 3.6–5.5)
PROT SERPL-MCNC: 6.9 G/DL (ref 6–8.2)
PROTHROMBIN TIME: 15.5 SEC (ref 12–14.6)
RBC # BLD AUTO: 4.22 M/UL (ref 4.7–6.1)
RETICS # AUTO: 0.06 M/UL (ref 0.04–0.12)
RETICS/RBC NFR: 1.5 % (ref 0.8–2.6)
SODIUM SERPL-SCNC: 144 MMOL/L (ref 135–145)
T4 FREE SERPL-MCNC: 1.2 NG/DL (ref 0.93–1.7)
TIBC SERPL-MCNC: 247 UG/DL (ref 250–450)
TSH SERPL DL<=0.005 MIU/L-ACNC: 3.06 UIU/ML (ref 0.38–5.33)
UIBC SERPL-MCNC: 196 UG/DL (ref 110–370)
VIT B12 SERPL-MCNC: 368 PG/ML (ref 211–911)
WBC # BLD AUTO: 5.6 K/UL (ref 4.8–10.8)

## 2024-12-16 PROCEDURE — 80053 COMPREHEN METABOLIC PANEL: CPT

## 2024-12-16 PROCEDURE — 83550 IRON BINDING TEST: CPT

## 2024-12-16 PROCEDURE — 85610 PROTHROMBIN TIME: CPT

## 2024-12-16 PROCEDURE — 36415 COLL VENOUS BLD VENIPUNCTURE: CPT

## 2024-12-16 PROCEDURE — 82728 ASSAY OF FERRITIN: CPT

## 2024-12-16 PROCEDURE — 84439 ASSAY OF FREE THYROXINE: CPT

## 2024-12-16 PROCEDURE — 85025 COMPLETE CBC W/AUTO DIFF WBC: CPT

## 2024-12-16 PROCEDURE — 85730 THROMBOPLASTIN TIME PARTIAL: CPT

## 2024-12-16 PROCEDURE — 84443 ASSAY THYROID STIM HORMONE: CPT

## 2024-12-16 PROCEDURE — 82746 ASSAY OF FOLIC ACID SERUM: CPT

## 2024-12-16 PROCEDURE — 82607 VITAMIN B-12: CPT

## 2024-12-16 PROCEDURE — 85046 RETICYTE/HGB CONCENTRATE: CPT

## 2024-12-16 PROCEDURE — 83540 ASSAY OF IRON: CPT

## 2025-06-16 ENCOUNTER — HOSPITAL ENCOUNTER (OUTPATIENT)
Facility: MEDICAL CENTER | Age: 80
End: 2025-06-16
Attending: FAMILY MEDICINE
Payer: COMMERCIAL

## 2025-06-16 LAB
ALBUMIN SERPL BCP-MCNC: 3.8 G/DL (ref 3.2–4.9)
ALBUMIN/GLOB SERPL: 1.5 G/DL
ALP SERPL-CCNC: 90 U/L (ref 30–99)
ALT SERPL-CCNC: 32 U/L (ref 2–50)
ANION GAP SERPL CALC-SCNC: 13 MMOL/L (ref 7–16)
AST SERPL-CCNC: 30 U/L (ref 12–45)
BASOPHILS # BLD AUTO: 0.3 % (ref 0–1.8)
BASOPHILS # BLD: 0.02 K/UL (ref 0–0.12)
BILIRUB SERPL-MCNC: 0.5 MG/DL (ref 0.1–1.5)
BUN SERPL-MCNC: 25 MG/DL (ref 8–22)
C DIFF TOX GENS STL QL NAA+PROBE: NEGATIVE
C PARVUM AG STL QL IA.RAPID: NEGATIVE
CALCIUM ALBUM COR SERPL-MCNC: 8.3 MG/DL (ref 8.5–10.5)
CALCIUM SERPL-MCNC: 8.1 MG/DL (ref 8.5–10.5)
CHLORIDE SERPL-SCNC: 115 MMOL/L (ref 96–112)
CHOLEST SERPL-MCNC: 90 MG/DL (ref 100–199)
CO2 SERPL-SCNC: 16 MMOL/L (ref 20–33)
CREAT SERPL-MCNC: 1.26 MG/DL (ref 0.5–1.4)
CRP SERPL HS-MCNC: <0.3 MG/DL (ref 0–0.75)
EOSINOPHIL # BLD AUTO: 0.16 K/UL (ref 0–0.51)
EOSINOPHIL NFR BLD: 2.3 % (ref 0–6.9)
ERYTHROCYTE [DISTWIDTH] IN BLOOD BY AUTOMATED COUNT: 50.4 FL (ref 35.9–50)
ERYTHROCYTE [SEDIMENTATION RATE] IN BLOOD BY WESTERGREN METHOD: 7 MM/HOUR (ref 0–20)
FASTING STATUS PATIENT QL REPORTED: NORMAL
G LAMBLIA AG STL QL IA.RAPID: NEGATIVE
GFR SERPLBLD CREATININE-BSD FMLA CKD-EPI: 58 ML/MIN/1.73 M 2
GLOBULIN SER CALC-MCNC: 2.6 G/DL (ref 1.9–3.5)
GLUCOSE SERPL-MCNC: 96 MG/DL (ref 65–99)
HCT VFR BLD AUTO: 41.4 % (ref 42–52)
HDLC SERPL-MCNC: 27 MG/DL
HGB BLD-MCNC: 13.2 G/DL (ref 14–18)
HGB RETIC QN AUTO: 35.2 PG/CELL (ref 29–35)
IMM GRANULOCYTES # BLD AUTO: 0.02 K/UL (ref 0–0.11)
IMM GRANULOCYTES NFR BLD AUTO: 0.3 % (ref 0–0.9)
IMM RETICS NFR: 8.5 % (ref 2.6–16.1)
IRON SATN MFR SERPL: 16 % (ref 15–55)
IRON SERPL-MCNC: 39 UG/DL (ref 50–180)
LDLC SERPL CALC-MCNC: 46 MG/DL
LYMPHOCYTES # BLD AUTO: 1.36 K/UL (ref 1–4.8)
LYMPHOCYTES NFR BLD: 19.4 % (ref 22–41)
MCH RBC QN AUTO: 31.5 PG (ref 27–33)
MCHC RBC AUTO-ENTMCNC: 31.9 G/DL (ref 32.3–36.5)
MCV RBC AUTO: 98.8 FL (ref 81.4–97.8)
MONOCYTES # BLD AUTO: 0.64 K/UL (ref 0–0.85)
MONOCYTES NFR BLD AUTO: 9.1 % (ref 0–13.4)
NEUTROPHILS # BLD AUTO: 4.81 K/UL (ref 1.82–7.42)
NEUTROPHILS NFR BLD: 68.6 % (ref 44–72)
NRBC # BLD AUTO: 0 K/UL
NRBC BLD-RTO: 0 /100 WBC (ref 0–0.2)
PLATELET # BLD AUTO: 155 K/UL (ref 164–446)
PMV BLD AUTO: 11.9 FL (ref 9–12.9)
POTASSIUM SERPL-SCNC: 4 MMOL/L (ref 3.6–5.5)
PROT SERPL-MCNC: 6.4 G/DL (ref 6–8.2)
RBC # BLD AUTO: 4.19 M/UL (ref 4.7–6.1)
RETICS # AUTO: 0.07 M/UL (ref 0.04–0.12)
RETICS/RBC NFR: 1.6 % (ref 0.8–2.6)
SODIUM SERPL-SCNC: 144 MMOL/L (ref 135–145)
T4 FREE SERPL-MCNC: 1.14 NG/DL (ref 0.93–1.7)
TIBC SERPL-MCNC: 237 UG/DL (ref 250–450)
TRIGL SERPL-MCNC: 86 MG/DL (ref 0–149)
TSH SERPL DL<=0.005 MIU/L-ACNC: 4.97 UIU/ML (ref 0.38–5.33)
UIBC SERPL-MCNC: 198 UG/DL (ref 110–370)
WBC # BLD AUTO: 7 K/UL (ref 4.8–10.8)

## 2025-06-16 PROCEDURE — 87329 GIARDIA AG IA: CPT

## 2025-06-16 PROCEDURE — 85046 RETICYTE/HGB CONCENTRATE: CPT

## 2025-06-16 PROCEDURE — 85025 COMPLETE CBC W/AUTO DIFF WBC: CPT

## 2025-06-16 PROCEDURE — 84439 ASSAY OF FREE THYROXINE: CPT

## 2025-06-16 PROCEDURE — 84443 ASSAY THYROID STIM HORMONE: CPT

## 2025-06-16 PROCEDURE — 80061 LIPID PANEL: CPT

## 2025-06-16 PROCEDURE — 87493 C DIFF AMPLIFIED PROBE: CPT

## 2025-06-16 PROCEDURE — 83550 IRON BINDING TEST: CPT

## 2025-06-16 PROCEDURE — 83540 ASSAY OF IRON: CPT

## 2025-06-16 PROCEDURE — 87899 AGENT NOS ASSAY W/OPTIC: CPT

## 2025-06-16 PROCEDURE — 87209 SMEAR COMPLEX STAIN: CPT

## 2025-06-16 PROCEDURE — 86140 C-REACTIVE PROTEIN: CPT

## 2025-06-16 PROCEDURE — 80053 COMPREHEN METABOLIC PANEL: CPT

## 2025-06-16 PROCEDURE — 87045 FECES CULTURE AEROBIC BACT: CPT

## 2025-06-16 PROCEDURE — 36415 COLL VENOUS BLD VENIPUNCTURE: CPT

## 2025-06-16 PROCEDURE — 87177 OVA AND PARASITES SMEARS: CPT

## 2025-06-16 PROCEDURE — 87328 CRYPTOSPORIDIUM AG IA: CPT

## 2025-06-16 PROCEDURE — 85652 RBC SED RATE AUTOMATED: CPT

## 2025-06-16 PROCEDURE — 87046 STOOL CULTR AEROBIC BACT EA: CPT

## 2025-06-17 LAB
E COLI SXT1+2 STL IA: NORMAL
SIGNIFICANT IND 70042: NORMAL
SITE SITE: NORMAL
SOURCE SOURCE: NORMAL

## 2025-06-19 LAB
BACTERIA STL CULT: NORMAL
E COLI SXT1+2 STL IA: NORMAL
SIGNIFICANT IND 70042: NORMAL
SITE SITE: NORMAL
SOURCE SOURCE: NORMAL

## 2025-06-21 LAB — OVA AND PARASITE, FECAL INTERPRETATION Q0595: NEGATIVE

## 2025-07-15 ENCOUNTER — APPOINTMENT (OUTPATIENT)
Dept: RADIOLOGY | Facility: MEDICAL CENTER | Age: 80
End: 2025-07-15
Attending: EMERGENCY MEDICINE
Payer: COMMERCIAL

## 2025-07-15 ENCOUNTER — HOSPITAL ENCOUNTER (OUTPATIENT)
Facility: MEDICAL CENTER | Age: 80
End: 2025-07-15
Attending: FAMILY MEDICINE
Payer: COMMERCIAL

## 2025-07-15 ENCOUNTER — HOSPITAL ENCOUNTER (INPATIENT)
Facility: MEDICAL CENTER | Age: 80
LOS: 6 days | End: 2025-07-21
Attending: EMERGENCY MEDICINE | Admitting: HOSPITALIST
Payer: COMMERCIAL

## 2025-07-15 PROBLEM — R79.89 ELEVATED TROPONIN: Status: ACTIVE | Noted: 2025-07-15

## 2025-07-15 PROBLEM — E87.20 METABOLIC ACIDOSIS: Status: ACTIVE | Noted: 2025-07-15

## 2025-07-15 LAB
ALBUMIN SERPL BCP-MCNC: 3.7 G/DL (ref 3.2–4.9)
ALBUMIN/GLOB SERPL: 1.1 G/DL
ALP SERPL-CCNC: 92 U/L (ref 30–99)
ALT SERPL-CCNC: 32 U/L (ref 2–50)
ANION GAP SERPL CALC-SCNC: 20 MMOL/L (ref 7–16)
AST SERPL-CCNC: 52 U/L (ref 12–45)
BASOPHILS # BLD AUTO: 0.2 % (ref 0–1.8)
BASOPHILS # BLD: 0.03 K/UL (ref 0–0.12)
BILIRUB SERPL-MCNC: 0.4 MG/DL (ref 0.1–1.5)
BUN SERPL-MCNC: 48 MG/DL (ref 8–22)
CALCIUM ALBUM COR SERPL-MCNC: 6.6 MG/DL (ref 8.5–10.5)
CALCIUM SERPL-MCNC: 6.4 MG/DL (ref 8.5–10.5)
CHLORIDE SERPL-SCNC: 112 MMOL/L (ref 96–112)
CO2 SERPL-SCNC: 10 MMOL/L (ref 20–33)
CREAT SERPL-MCNC: 2.6 MG/DL (ref 0.5–1.4)
EOSINOPHIL # BLD AUTO: 0 K/UL (ref 0–0.51)
EOSINOPHIL NFR BLD: 0 % (ref 0–6.9)
ERYTHROCYTE [DISTWIDTH] IN BLOOD BY AUTOMATED COUNT: 45.1 FL (ref 35.9–50)
FERRITIN SERPL-MCNC: 274 NG/ML (ref 22–322)
GFR SERPLBLD CREATININE-BSD FMLA CKD-EPI: 24 ML/MIN/1.73 M 2
GLOBULIN SER CALC-MCNC: 3.3 G/DL (ref 1.9–3.5)
GLUCOSE SERPL-MCNC: 114 MG/DL (ref 65–99)
HCT VFR BLD AUTO: 37.8 % (ref 42–52)
HGB BLD-MCNC: 12.6 G/DL (ref 14–18)
IMM GRANULOCYTES # BLD AUTO: 0.15 K/UL (ref 0–0.11)
IMM GRANULOCYTES NFR BLD AUTO: 0.8 % (ref 0–0.9)
IRON SATN MFR SERPL: 6 % (ref 15–55)
IRON SERPL-MCNC: 12 UG/DL (ref 50–180)
LYMPHOCYTES # BLD AUTO: 1.18 K/UL (ref 1–4.8)
LYMPHOCYTES NFR BLD: 6.4 % (ref 22–41)
MCH RBC QN AUTO: 31.2 PG (ref 27–33)
MCHC RBC AUTO-ENTMCNC: 33.3 G/DL (ref 32.3–36.5)
MCV RBC AUTO: 93.6 FL (ref 81.4–97.8)
MONOCYTES # BLD AUTO: 1.6 K/UL (ref 0–0.85)
MONOCYTES NFR BLD AUTO: 8.7 % (ref 0–13.4)
NEUTROPHILS # BLD AUTO: 15.35 K/UL (ref 1.82–7.42)
NEUTROPHILS NFR BLD: 83.9 % (ref 44–72)
NRBC # BLD AUTO: 0 K/UL
NRBC BLD-RTO: 0 /100 WBC (ref 0–0.2)
PLATELET # BLD AUTO: 205 K/UL (ref 164–446)
PMV BLD AUTO: 12.1 FL (ref 9–12.9)
POTASSIUM SERPL-SCNC: 3.4 MMOL/L (ref 3.6–5.5)
PROT SERPL-MCNC: 7 G/DL (ref 6–8.2)
PTH-INTACT SERPL-MCNC: 48.2 PG/ML (ref 14–72)
RBC # BLD AUTO: 4.04 M/UL (ref 4.7–6.1)
SODIUM SERPL-SCNC: 142 MMOL/L (ref 135–145)
TIBC SERPL-MCNC: 195 UG/DL (ref 250–450)
UIBC SERPL-MCNC: 183 UG/DL (ref 110–370)
WBC # BLD AUTO: 18.3 K/UL (ref 4.8–10.8)

## 2025-07-15 PROCEDURE — 36415 COLL VENOUS BLD VENIPUNCTURE: CPT

## 2025-07-15 PROCEDURE — 83540 ASSAY OF IRON: CPT

## 2025-07-15 PROCEDURE — 80053 COMPREHEN METABOLIC PANEL: CPT

## 2025-07-15 PROCEDURE — 82728 ASSAY OF FERRITIN: CPT

## 2025-07-15 PROCEDURE — 83970 ASSAY OF PARATHORMONE: CPT

## 2025-07-15 PROCEDURE — 83550 IRON BINDING TEST: CPT

## 2025-07-15 PROCEDURE — 85025 COMPLETE CBC W/AUTO DIFF WBC: CPT

## 2025-07-15 ASSESSMENT — PATIENT HEALTH QUESTIONNAIRE - PHQ9
2. FEELING DOWN, DEPRESSED, IRRITABLE, OR HOPELESS: NOT AT ALL
1. LITTLE INTEREST OR PLEASURE IN DOING THINGS: NOT AT ALL
SUM OF ALL RESPONSES TO PHQ9 QUESTIONS 1 AND 2: 0

## 2025-07-15 ASSESSMENT — FIBROSIS 4 INDEX
FIB4 SCORE: 3.59
FIB4 SCORE: 4.64

## 2025-07-16 PROBLEM — E83.42 HYPOMAGNESEMIA: Status: ACTIVE | Noted: 2025-07-16

## 2025-07-16 PROBLEM — E83.51 HYPOCALCEMIA: Status: ACTIVE | Noted: 2025-07-16

## 2025-07-16 PROBLEM — N13.30 HYDRONEPHROSIS: Status: ACTIVE | Noted: 2025-07-16

## 2025-07-16 PROBLEM — N39.0 SEPSIS SECONDARY TO UTI (HCC): Status: ACTIVE | Noted: 2025-07-16

## 2025-07-16 PROBLEM — N17.9 ACUTE RENAL FAILURE (ARF) (HCC): Status: ACTIVE | Noted: 2025-07-16

## 2025-07-16 PROBLEM — E87.8 ELECTROLYTE ABNORMALITY: Status: ACTIVE | Noted: 2025-07-16

## 2025-07-16 PROBLEM — I48.91 ATRIAL FIBRILLATION (HCC): Status: ACTIVE | Noted: 2025-07-16

## 2025-07-16 PROBLEM — I48.20 CHRONIC ATRIAL FIBRILLATION (HCC): Status: ACTIVE | Noted: 2025-07-16

## 2025-07-16 PROBLEM — A41.9 SEPSIS SECONDARY TO UTI (HCC): Status: ACTIVE | Noted: 2025-07-16

## 2025-07-16 PROBLEM — D64.9 NORMOCYTIC ANEMIA: Status: ACTIVE | Noted: 2025-07-16

## 2025-07-16 SDOH — ECONOMIC STABILITY: TRANSPORTATION INSECURITY
IN THE PAST 12 MONTHS, HAS THE LACK OF TRANSPORTATION KEPT YOU FROM MEDICAL APPOINTMENTS OR FROM GETTING MEDICATIONS?: NO

## 2025-07-16 SDOH — ECONOMIC STABILITY: TRANSPORTATION INSECURITY
IN THE PAST 12 MONTHS, HAS LACK OF RELIABLE TRANSPORTATION KEPT YOU FROM MEDICAL APPOINTMENTS, MEETINGS, WORK OR FROM GETTING THINGS NEEDED FOR DAILY LIVING?: NO

## 2025-07-16 ASSESSMENT — ENCOUNTER SYMPTOMS
BRUISES/BLEEDS EASILY: 0
DIARRHEA: 1
DEPRESSION: 0
CARDIOVASCULAR NEGATIVE: 1
RESPIRATORY NEGATIVE: 1
DOUBLE VISION: 0
MUSCULOSKELETAL NEGATIVE: 1
WEAKNESS: 1
NAUSEA: 1
WEAKNESS: 0
NECK PAIN: 0
PSYCHIATRIC NEGATIVE: 1
PALPITATIONS: 0
INSOMNIA: 0
EYES NEGATIVE: 1
HEADACHES: 0
BLURRED VISION: 0
DIZZINESS: 0
FEVER: 0
VOMITING: 1
COUGH: 0
MYALGIAS: 0
SORE THROAT: 0
SHORTNESS OF BREATH: 0

## 2025-07-16 ASSESSMENT — LIFESTYLE VARIABLES
TOTAL SCORE: 0
ON A TYPICAL DAY WHEN YOU DRINK ALCOHOL HOW MANY DRINKS DO YOU HAVE: 0
AVERAGE NUMBER OF DAYS PER WEEK YOU HAVE A DRINK CONTAINING ALCOHOL: 0
HAVE PEOPLE ANNOYED YOU BY CRITICIZING YOUR DRINKING: NO
TOTAL SCORE: 0
TOTAL SCORE: 0
HAVE YOU EVER FELT YOU SHOULD CUT DOWN ON YOUR DRINKING: NO
EVER HAD A DRINK FIRST THING IN THE MORNING TO STEADY YOUR NERVES TO GET RID OF A HANGOVER: NO
CONSUMPTION TOTAL: NEGATIVE
ALCOHOL_USE: NO
HOW MANY TIMES IN THE PAST YEAR HAVE YOU HAD 5 OR MORE DRINKS IN A DAY: 0
EVER FELT BAD OR GUILTY ABOUT YOUR DRINKING: NO

## 2025-07-16 ASSESSMENT — SOCIAL DETERMINANTS OF HEALTH (SDOH)
WITHIN THE PAST 12 MONTHS, YOU WORRIED THAT YOUR FOOD WOULD RUN OUT BEFORE YOU GOT THE MONEY TO BUY MORE: NEVER TRUE
WITHIN THE PAST 12 MONTHS, THE FOOD YOU BOUGHT JUST DIDN'T LAST AND YOU DIDN'T HAVE MONEY TO GET MORE: NEVER TRUE
IN THE PAST 12 MONTHS, HAS THE ELECTRIC, GAS, OIL, OR WATER COMPANY THREATENED TO SHUT OFF SERVICE IN YOUR HOME?: NO

## 2025-07-16 ASSESSMENT — COGNITIVE AND FUNCTIONAL STATUS - GENERAL
WALKING IN HOSPITAL ROOM: A LITTLE
SUGGESTED CMS G CODE MODIFIER DAILY ACTIVITY: CJ
MOBILITY SCORE: 19
TOILETING: A LITTLE
DAILY ACTIVITIY SCORE: 21
MOVING FROM LYING ON BACK TO SITTING ON SIDE OF FLAT BED: A LITTLE
CLIMB 3 TO 5 STEPS WITH RAILING: A LITTLE
HELP NEEDED FOR BATHING: A LITTLE
SUGGESTED CMS G CODE MODIFIER MOBILITY: CK
STANDING UP FROM CHAIR USING ARMS: A LITTLE
DRESSING REGULAR LOWER BODY CLOTHING: A LITTLE
MOVING TO AND FROM BED TO CHAIR: A LITTLE

## 2025-07-16 ASSESSMENT — CHA2DS2 SCORE
SEX: MALE
HYPERTENSION: YES
VASCULAR DISEASE: NO
CHA2DS2 VASC SCORE: 3
PRIOR STROKE OR TIA OR THROMBOEMBOLISM: NO
AGE 75 OR GREATER: YES
DIABETES: NO
AGE 65 TO 74: NO
CHF OR LEFT VENTRICULAR DYSFUNCTION: NO

## 2025-07-16 NOTE — H&P
"Hospital Medicine History & Physical Note    Date of Service  7/15/2025    Primary Care Physician  Grzegorz STEPHENSON M.D.    Consultants  Urology: ERP discussed this case with Dr. Pierce    Code Status  Full Code    Chief Complaint  Chief Complaint   Patient presents with    Diarrhea     Intermittent since April      Weakness     C/o increasing weakness    Abnormal Labs     Advised by PCP to come to ED for abnormal Ca+ lvl       History of Presenting Illness  Thomas Slater is a 80 y.o. male, with h/o HTN, HLD, CKD3 who was sent to the ER by PCP on 7/15/2025 with abnormal lab results, metabolic acidosis and severe hypocalcemia.    Patient reports that has had intermittent diarrhea since last year and still does not know the cause.  Over the last month or so, also is unable to eat much as he reports that \"I dry heave and vomit everything a few seconds after eating \".  He is feeling so unwell to the point that is afraid of eating.  He denies having significant pain and went to his PCP today for evaluation on his condition that is getting worse.  Patient was told to immediately come to the emergency department as he was found to have a calcium level of 6.4, creatinine of 2.6, CO2 of 10 and anion gap of 20.  Patient lost at least 25 pounds over the last 6 months.    I discussed the plan of care with patient and ERP Dr. Radford.    Review of Systems  Review of Systems   Constitutional:  Positive for malaise/fatigue. Negative for fever.   HENT:  Negative for congestion and sore throat.    Eyes:  Negative for blurred vision and double vision.   Respiratory:  Negative for cough and shortness of breath.    Cardiovascular:  Negative for chest pain and palpitations.   Gastrointestinal:  Positive for diarrhea, nausea and vomiting.   Genitourinary:  Negative for dysuria and urgency.   Musculoskeletal:  Negative for myalgias and neck pain.   Skin:  Negative for itching and rash.   Neurological:  Negative for dizziness, " "weakness and headaches.   Endo/Heme/Allergies:  Does not bruise/bleed easily.   Psychiatric/Behavioral:  Negative for depression. The patient does not have insomnia.        Past Medical History   has a past medical history of Abnormal EKG, Hyperlipidemia, and Hypertension.    Surgical History  Denies recent surgeries    Family History  Family History   Problem Relation Age of Onset    Heart Disease Neg Hx         Family history reviewed with patient. There is no family history that is pertinent to the chief complaint.     Social History   reports that he quit smoking about 52 years ago. His smoking use included cigarettes. He has never used smokeless tobacco. He reports that he does not currently use drugs. He reports that he does not drink alcohol.    Allergies  Allergies[1]    Medications  Prior to Admission Medications   Prescriptions Last Dose Informant Patient Reported? Taking?   Multiple Vitamins-Minerals (CENTRUM SILVER PO) 7/14/2025 Morning  Yes Yes   Sig: Take  by mouth. \"150 multivitamin\"   amLODIPine (NORVASC) 5 MG Tab 7/14/2025 Morning  Yes Yes   Sig: Take 5 mg by mouth every day.   aspirin EC (ECOTRIN) 81 MG Tablet Delayed Response 7/14/2025 Morning  Yes Yes   Sig: Take 81 mg by mouth every day.   atorvastatin (LIPITOR) 10 MG Tab 7/14/2025 Morning  Yes Yes   Sig: Take 10 mg by mouth every evening.   levothyroxine (SYNTHROID) 112 MCG Tab 7/14/2025 Morning  Yes Yes   Sig: Take 112 mcg by mouth every morning on an empty stomach.   losartan (COZAAR) 100 MG Tab 7/14/2025 Morning  Yes Yes   Sig: Take 100 mg by mouth every day.   vitamin D3 (CHOLECALCIFEROL) 25 MCG (1000 UT) Tab 7/14/2025 Morning  Yes Yes   Sig: Take 5,000 Units by mouth every day.      Facility-Administered Medications: None       Physical Exam  Temp:  [36.8 °C (98.3 °F)] 36.8 °C (98.3 °F)  Pulse:  [78-98] 98  Resp:  [23-27] 23  BP: (107-119)/(56-58) 119/56  SpO2:  [94 %-98 %] 97 %  Blood Pressure : 119/56   Temperature: 36.8 °C (98.3 °F) "   Pulse: 98   Respiration: (!) 23   Pulse Oximetry: 97 %       Physical Exam  Constitutional:       Appearance: Normal appearance.   HENT:      Head: Normocephalic and atraumatic.      Nose: Nose normal.      Mouth/Throat:      Mouth: Mucous membranes are moist.      Pharynx: Oropharynx is clear.   Eyes:      Extraocular Movements: Extraocular movements intact.      Pupils: Pupils are equal, round, and reactive to light.   Cardiovascular:      Rate and Rhythm: Normal rate and regular rhythm.      Pulses: Normal pulses.      Heart sounds: Normal heart sounds.   Pulmonary:      Effort: Pulmonary effort is normal.      Breath sounds: Normal breath sounds.   Abdominal:      General: Abdomen is flat. Bowel sounds are normal.      Palpations: Abdomen is soft.   Musculoskeletal:      Cervical back: Normal range of motion and neck supple.   Skin:     General: Skin is warm and dry.   Neurological:      General: No focal deficit present.      Mental Status: He is alert and oriented to person, place, and time.   Psychiatric:         Mood and Affect: Mood normal.         Behavior: Behavior normal.         Laboratory:  Recent Labs     07/15/25  1132 07/15/25  1810   WBC 18.3* 18.2*   RBC 4.04* 4.03*   HEMOGLOBIN 12.6* 12.8*   HEMATOCRIT 37.8* 37.8*   MCV 93.6 93.8   MCH 31.2 31.8   MCHC 33.3 33.9   RDW 45.1 44.9   PLATELETCT 205 188   MPV 12.1 12.0     Recent Labs     07/15/25  1132 07/15/25  1810   SODIUM 142 141   POTASSIUM 3.4* 3.5*   CHLORIDE 112 111   CO2 10* 9*   GLUCOSE 114* 101*   BUN 48* 53*   CREATININE 2.60* 3.06*   CALCIUM 6.4* 6.3*     Recent Labs     07/15/25  1132 07/15/25  1810   ALTSGPT 32 40   ASTSGOT 52* 69*   ALKPHOSPHAT 92 106*   TBILIRUBIN 0.4 0.4   LIPASE  --  52   GLUCOSE 114* 101*         Recent Labs     07/15/25  1810   NTPROBNP 2523*         Recent Labs     07/15/25  1810 07/15/25  2125   TROPONINT 58* 49*       Imaging:  CT-ABDOMEN-PELVIS W/O   Final Result      1.  Atherosclerosis.   2.  Left renal  stone.   3.  Left-sided hydronephrosis secondary to a UPJ stenosis versus parapelvic cyst formation.   4.  Enlarged prostate.   5.  No evidence for bowel obstruction, diverticulitis, or appendicitis.         DX-CHEST-PORTABLE (1 VIEW)   Final Result      Hypoinflation without other evidence for acute cardiopulmonary disease.          X-Ray:  I have personally reviewed the images and compared with prior images. and My impression is: CT of the abdomen and pelvis is showing left renal stone with left-sided hydronephrosis secondary to a UPJ stenosis versus pelvic cyst formation.  Also enlarged prostate but no other acute intra-abdominal findings.  Chest x-ray was negative for acute cardiopulmonary disease.  EKG:  I have personally reviewed the images and compared with prior images. and My impression is: Normal sinus rhythm at 87 bpm.  Multiple PVCs with right bundle branch block.  No acute ST elevation.    Assessment/Plan:  Justification for Admission Status  I anticipate this patient will require at least two midnights for appropriate medical management, necessitating inpatient admission because ICU admission: 88-year-old male with severe metabolic acidosis and electrolyte imbalance in the setting of acute UTI, left sided hydronephrosis and ongoing acute on chronic diarrhea and now unable to keep fluids down.    * Metabolic acidosis- (present on admission)  Assessment & Plan  - ICU status.  - Patient with profound metabolic acidosis and initial CO2 of 9 with anion gap of 22.  - Patient is having very poor nutrition with ongoing vomiting and diarrhea of unclear etiology.  - He has a UTI and CT found left hydronephrosis without kidney stone.  - Patient will be started empirically on IV Zosyn.  His initial creatinine is 3.06.  - Additionally he has severe electrolyte abnormalities including hypocalcemia, hypomagnesemia and mild hypokalemia.  - Metabolic acidosis is likely multifactorial.  If patient not improving he might  need additional imaging studies.    Hypomagnesemia  Assessment & Plan  -Profound hypomagnesemia with initial magnesium of 0.4.  I am adding 4 g of magnesium sulfate and will recheck magnesium at 4 AM and then every 4 hours.  As patient has EKG changes    Hypocalcemia  Assessment & Plan  - Significantly low calcium of 6.3 on admission.  Patient is receiving calcium gluconate replacement with serial calcium checks overnight.  Monitoring on telemetry given multiple PVCs.  Additionally he is becoming severely tachycardic with minimal exertion.      Sepsis secondary to UTI (HCC)  Assessment & Plan  This is Sepsis Present on admission  SIRS criteria identified on my evaluation include: Tachycardia, with heart rate greater than 90 BPM and Leukocytosis, with WBC greater than 12,000  Clinical indicators of end organ dysfunction include Acute Renal Failure, with a finding of creatinine greater than 2 (patient does not have ESRD or CKD  Source is Acute UTI  Sepsis protocol initiated  Crystalloid Fluid Administration: Fluid resuscitation ordered per standard protocol - 30 mL/kg per current or ideal body weight -patient received 1 L bolus in the emergency department and I am adding 1088 mL to complete 30 mg/kg based on ideal body weight  IV antibiotics as appropriate for source of sepsis  Reassessment: I have reassessed the patient's hemodynamic status    Normocytic anemia  Assessment & Plan  - Hemoglobin is 12.8 on admission.  There is no history of bleeding.  Monitor CBC in the morning.    Hydronephrosis  Assessment & Plan  CT of the abdomen and pelvis is showing left renal stone with left-sided hydronephrosis secondary to a UPJ stenosis versus pelvic cyst formation.   - ERP discussed this case with urologist, Dr. Pierce who will evaluate patient in the morning.  I appreciate consult recommendations.    Acute renal failure (ARF) (HCC)  Assessment & Plan  -CT of the abdomen and pelvis is showing left renal stone with  left-sided hydronephrosis secondary to a UPJ stenosis versus pelvic cyst formation.      Elevated troponin  Assessment & Plan  - Likely due to demand ischemia.  Initial troponin 58 > 49.  -Patient had severe hypocalcemia and will need to be monitor on telemetry.  I am adding serial cardiac enzymes    RBBB- (present on admission)  Assessment & Plan  - Chronic finding but now EKG with additional ectopy, likely due to underlying electrolytes abnormalities.    Hypothyroid- (present on admission)  Assessment & Plan  - Continue levothyroxine.  TSH last month was normal.    Stage 3a chronic kidney disease- (present on admission)  Assessment & Plan  - Plan as above.    Hyperlipidemia- (present on admission)  Assessment & Plan  Takes atorvastatin that I will continue.    Hypertension- (present on admission)  Assessment & Plan  -Patient takes losartan and amlodipine.  His blood pressure was normal in the emergency department and I will hold all his antihypertensive medications for now.        The patient remains critically ill.  Critical care time =63  minutes in directly providing and coordinating critical care and extensive data review.  This includes time spent before the visit reviewing the chart, time spent evaluating the patient face-to-face  in the room, time discussing and updating Nursing Staff about plan and time spent after the visit reviewing results and on documentation. No time overlap and excludes procedures.    VTE prophylaxis: SCDs/TEDs           [1] No Known Allergies

## 2025-07-16 NOTE — PROGRESS NOTES
Bronson from Lab called with critical result of calcium of 6.5 at 0035. Critical lab result read back to Bronson.   Dr. Barnard notified of critical lab result at 0040.  Critical lab result read back by Dr. Barnard.

## 2025-07-16 NOTE — ASSESSMENT & PLAN NOTE
This is Sepsis Present on admission  SIRS criteria identified on my evaluation include: Tachycardia, with heart rate greater than 90 BPM and Leukocytosis, with WBC greater than 12,000  Clinical indicators of end organ dysfunction include Acute Renal Failure, with a finding of creatinine greater than 2 (patient does not have ESRD or CKD  Source is Acute UTI  Sepsis protocol initiated  Crystalloid Fluid Administration: Fluid resuscitation ordered per standard protocol - 30 mL/kg per current or ideal body weight -patient received 1 L bolus in the emergency department and I am adding 1088 mL to complete 30 mg/kg based on ideal body weight  IV antibiotics as appropriate for source of sepsis  Reassessment: I have reassessed the patient's hemodynamic status

## 2025-07-16 NOTE — CARE PLAN
The patient is Stable - Low risk of patient condition declining or worsening      Problem: Knowledge Deficit - Standard  Goal: Patient and family/care givers will demonstrate understanding of plan of care, disease process/condition, diagnostic tests and medications  Description: Target End Date:  1-3 days or as soon as patient condition allows    Document in Patient Education    1.  Patient and family/caregiver oriented to unit, equipment, visitation policy and means for communicating concern  2.  Complete/review Learning Assessment  3.  Assess knowledge level of disease process/condition, treatment plan, diagnostic tests and medications  4.  Explain disease process/condition, treatment plan, diagnostic tests and medications  Outcome: Progressing     Problem: Fall Risk  Goal: Patient will remain free from falls  Description: Target End Date:  Prior to discharge or change in level of care    Document interventions on the [employer] Her[employer] Fall Risk Assessment    1.  Assess for fall risk factors  2.  Implement fall precautions  Outcome: Progressing     Problem: Hemodynamics  Goal: Patient's hemodynamics, fluid balance and neurologic status will be stable or improve  Description: Target End Date:  Prior to discharge or change in level of care    Document on Assessment and I/O flowsheet templates    1.  Monitor vital signs, pulse oximetry and cardiac monitor per provider order and/or policy  2.  Maintain blood pressure per provider order  3.  Hemodynamic monitoring per provider order  4.  Manage IV fluids and IV infusions  5.  Monitor intake and output  6.  Daily weights per unit policy or provider order  7.  Assess peripheral pulses and capillary refill  8.  Assess color and body temperature  9.  Position patient for maximum circulation/cardiac output  10. Monitor for signs/symptoms of excessive bleeding  11. Assess mental status, restlessness and changes in level of consciousness  12. Monitor temperature and report fever  or hypothermia to provider immediately. Consideration of targeted temperature management.  Outcome: Progressing     Problem: Fluid Volume  Goal: Fluid volume balance will be maintained  Description: Target End Date:  Prior to discharge or change in level of care    Document on I/O flowsheet    1.  Monitor intake and output as ordered  2.  Promote oral intake as appropriate  3.  Report inadequate intake or output to physician  4.  Administer IV therapy as ordered  5.  Weights per provider order  6.  Assess for signs and symptoms of bleeding  7.  Monitor for signs of fluid overload (respiratory changes, edema, weight gain, increased abdominal girth)  8.  Monitor of signs for inadequate fluid volume (poor skin turgor, dry mucous membranes)  9.  Instruct patient on adherence to fluid restrictions  Outcome: Progressing     Problem: Urinary - Renal Perfusion  Goal: Ability to achieve and maintain adequate renal perfusion and functioning will improve  Description: Target End Date:  Prior to discharge or change in level of care    Document on I/O and Assessment flowsheet    1.  Urine output will remain greater than 0.5ml/Kg/HR  2.  Monitor amount and/or characteristics of urine per order/policy. Specific gravity per order/policy  3.  Assess signs and symptoms of renal dysfunction  Outcome: Progressing     Problem: Respiratory  Goal: Patient will achieve/maintain optimum respiratory ventilation and gas exchange  Description: Target End Date:  Prior to discharge or change in level of care    Document on Assessment flowsheet    1.  Assess and monitor rate, rhythm, depth and effort of respiration  2.  Breath sounds assessed qshift and/or as needed  3.  Assess O2 saturation, administer/titrate oxygen as ordered  4.  Position patient for maximum ventilatory efficiency  5.  Turn, cough, and deep breath with splinting to improve effectiveness  6.  Collaborate with RT to administer medication/treatments per order  7.  Encourage use  of incentive spirometer and encourage patient to cough after use and utilize splinting techniques if applicable  8.  Airway suctioning  9.  Monitor sputum production for changes in color, consistency and frequency  10. Perform frequent oral hygiene  11. Alternate physical activity with rest periods  Outcome: Progressing     Problem: Mechanical Ventilation  Goal: Safe management of artificial airway and ventilation  Description: Target End Date:  when vent discontinued    Document on VAP flowsheet and Airway LDA    1.  Daily awakening trials per provider order/policy  2.  Suctioning and care of ET/Trach tube (document on LDA)  3.  Collaborate with RT to administer medications/treatments  4.  Ambu bag at bedside and available for transport  5.  Trach patient - replacement trach at bedside  6.  Provide communication tools if applicable  Outcome: Progressing  Goal: Successful weaning off mechanical ventilator, spontaneously maintains adequate gas exchange  Description: Target End Date:  when vent discontinued    1.  Follow universal weaning protocol for patients on mechanical ventilation per order  2.  Review contraindication list.  Obtain provider order to wean in presence of contraindication.  3.  Obtain Jeanne Sedation-Agitation Score  4.  Jeanne Score 1-2:  sedation vacation  5.  Jeanne Score 3-4:  Collaborate with provider and/or RT to determine readiness for trial  6.  Begin 2 hour trial of weaning following protocol  7.  Evaluate for fatigue parameters per protocol  8.  Fatigue parameters triggered:  Stop wean and return to previous ASV% setting or increase % minute volume to offset work or breathing  Outcome: Progressing  Goal: Patient will be able to express needs and understand communication  Description: Target End Date:  when vent discontinued    1.  Assess ability to communicate and understand  2.  Provide communication tools  3.  Collaborate with Speech Therapy for [facility]  Outcome: Progressing     Problem:  Physical Regulation  Goal: Diagnostic test results will improve  Description: Target End Date:  Prior to discharge or change in level of care    1.  Monitor lactic acid levels  2.  Monitor ABG's  3.  Monitor diagnostic test results  Outcome: Progressing  Goal: Signs and symptoms of infection will decrease  Description: Target End Date:  Prior to discharge or change in level of care    1.  Remove potential routes of infection, such as central lines and urinary catheter  2.  Follow facility protocol for changing IV tubing and sites  3.  Collaborate with Infectious Disease  4.  Antibiotic therapy per provider order  5.  Note drug effects and monitor for antibiotic toxicity  Outcome: Progressing

## 2025-07-16 NOTE — H&P
Hospital Medicine History & Physical Note    Date of Service  7/15/2025    Primary Care Physician  Grzegorz STEPHENSON M.D.    Consultants  Urology: ERP discussed this case with Dr. Pierce    Code Status  Full Code    Chief Complaint  Chief Complaint   Patient presents with   • Diarrhea     Intermittent since April     • Weakness     C/o increasing weakness   • Abnormal Labs     Advised by PCP to come to ED for abnormal Ca+ lvl       History of Presenting Illness  Thomas Slater is a 80 y.o. male, with h/o HTN, HLD, CKD3 who was sent to the ER by PCP on 7/15/2025 with abnormal lab results, metabolic acidosis and severe hypocalcemia.    Patient reports that    I discussed the plan of care with {Discussed with...:20658}.    Review of Systems  Review of Systems   Constitutional:  Positive for malaise/fatigue. Negative for fever.   HENT:  Negative for congestion and sore throat.    Eyes:  Negative for blurred vision and double vision.   Respiratory:  Negative for cough and shortness of breath.    Cardiovascular:  Negative for chest pain and palpitations.   Gastrointestinal:  Positive for diarrhea, nausea and vomiting.   Genitourinary:  Negative for dysuria and urgency.   Musculoskeletal:  Negative for myalgias and neck pain.   Skin:  Negative for itching and rash.   Neurological:  Negative for dizziness, weakness and headaches.   Endo/Heme/Allergies:  Does not bruise/bleed easily.   Psychiatric/Behavioral:  Negative for depression. The patient does not have insomnia.        Past Medical History   has a past medical history of Abnormal EKG, Hyperlipidemia, and Hypertension.    Surgical History   has no past surgical history on file.     Family History  Family History   Problem Relation Age of Onset   • Heart Disease Neg Hx         Family history reviewed with patient. There is no family history that is pertinent to the chief complaint.     Social History   reports that he quit smoking about 52 years ago. His  "smoking use included cigarettes. He has never used smokeless tobacco. He reports that he does not currently use drugs. He reports that he does not drink alcohol.    Allergies  Allergies[1]    Medications  Prior to Admission Medications   Prescriptions Last Dose Informant Patient Reported? Taking?   Multiple Vitamins-Minerals (CENTRUM SILVER PO) 7/14/2025 Morning  Yes Yes   Sig: Take  by mouth. \"150 multivitamin\"   amLODIPine (NORVASC) 5 MG Tab 7/14/2025 Morning  Yes Yes   Sig: Take 5 mg by mouth every day.   aspirin EC (ECOTRIN) 81 MG Tablet Delayed Response 7/14/2025 Morning  Yes Yes   Sig: Take 81 mg by mouth every day.   atorvastatin (LIPITOR) 10 MG Tab 7/14/2025 Morning  Yes Yes   Sig: Take 10 mg by mouth every evening.   levothyroxine (SYNTHROID) 112 MCG Tab 7/14/2025 Morning  Yes Yes   Sig: Take 112 mcg by mouth every morning on an empty stomach.   losartan (COZAAR) 100 MG Tab 7/14/2025 Morning  Yes Yes   Sig: Take 100 mg by mouth every day.   vitamin D3 (CHOLECALCIFEROL) 25 MCG (1000 UT) Tab 7/14/2025 Morning  Yes Yes   Sig: Take 5,000 Units by mouth every day.      Facility-Administered Medications: None       Physical Exam  Temp:  [36.8 °C (98.3 °F)] 36.8 °C (98.3 °F)  Pulse:  [78-98] 98  Resp:  [23-27] 23  BP: (107-119)/(56-58) 119/56  SpO2:  [94 %-98 %] 97 %  Blood Pressure : 119/56   Temperature: 36.8 °C (98.3 °F)   Pulse: 98   Respiration: (!) 23   Pulse Oximetry: 97 %       Physical Exam    Laboratory:  Recent Labs     07/15/25  1132 07/15/25  1810   WBC 18.3* 18.2*   RBC 4.04* 4.03*   HEMOGLOBIN 12.6* 12.8*   HEMATOCRIT 37.8* 37.8*   MCV 93.6 93.8   MCH 31.2 31.8   MCHC 33.3 33.9   RDW 45.1 44.9   PLATELETCT 205 188   MPV 12.1 12.0     Recent Labs     07/15/25  1132 07/15/25  1810   SODIUM 142 141   POTASSIUM 3.4* 3.5*   CHLORIDE 112 111   CO2 10* 9*   GLUCOSE 114* 101*   BUN 48* 53*   CREATININE 2.60* 3.06*   CALCIUM 6.4* 6.3*     Recent Labs     07/15/25  1132 07/15/25  1810   ALTSGPT 32 40 "   ASTSGOT 52* 69*   ALKPHOSPHAT 92 106*   TBILIRUBIN 0.4 0.4   LIPASE  --  52   GLUCOSE 114* 101*         Recent Labs     07/15/25  1810   NTPROBNP 2523*         Recent Labs     07/15/25  1810 07/15/25  2125   TROPONINT 58* 49*       Imaging:  CT-ABDOMEN-PELVIS W/O   Final Result      1.  Atherosclerosis.   2.  Left renal stone.   3.  Left-sided hydronephrosis secondary to a UPJ stenosis versus parapelvic cyst formation.   4.  Enlarged prostate.   5.  No evidence for bowel obstruction, diverticulitis, or appendicitis.         DX-CHEST-PORTABLE (1 VIEW)   Final Result      Hypoinflation without other evidence for acute cardiopulmonary disease.          X-Ray:  I have personally reviewed the images and compared with prior images. and My impression is: CT of the abdomen and pelvis is showing left renal stone with left-sided hydronephrosis secondary to a UPJ stenosis versus pelvic cyst formation.  Also enlarged prostate but no other acute intra-abdominal findings.  Chest x-ray was negative for acute cardiopulmonary disease.  EKG:  I have personally reviewed the images and compared with prior images. and My impression is: Normal sinus rhythm at 87 bpm.  Multiple PVCs with right bundle branch block.  No acute ST elevation.    Assessment/Plan:  Justification for Admission Status  I anticipate this patient {Anticipated Status:54634}    Patient will need a ICU (Adult and Pediatrics) bed on MEDICAL service .  The need is secondary to ***.    No new Assessment & Plan notes have been filed under this hospital service since the last note was generated.  Service: Hospital Medicine      VTE prophylaxis: {VTE selection:20994}               [1]  No Known Allergies

## 2025-07-16 NOTE — ASSESSMENT & PLAN NOTE
Troponin was 58 -> 49 -> 48.  EKG showed RBBB and LAFB, no significant change from previous EKG a few years ago. Denies chest pain.  Elevated troponin was likely due to demand ischemia from A-fib with RVR, and YADI

## 2025-07-16 NOTE — ASSESSMENT & PLAN NOTE
Creatinine has been increasing, was 2.6 on admission, 4.06 on 7/19/2025, Johns catheter was placed on 7/19/2025 due to low urine output with 1200 cc on bladder scan.  Creatinine has improved to 3.64 today.  Per urology, patient likely has bladder outlet obstruction.  Further recommendations per urology.

## 2025-07-16 NOTE — ASSESSMENT & PLAN NOTE
On admission, CO2 was 9, anion gap was 22.  No significant improvement.  Anion gap inormalized.  On sodium bicarb.  No positive cultures.

## 2025-07-16 NOTE — ED PROVIDER NOTES
ER Provider Note    Scribed for  Jimmie Nielsen D.O. by Jimmie Nielsen D.O.. 7/15/2025   8:17 PM    Primary Care Provider: Grzegorz STEPHENSON M.D.    CHIEF COMPLAINT  Chief Complaint   Patient presents with    Diarrhea     Intermittent since April      Weakness     C/o increasing weakness    Abnormal Labs     Advised by PCP to come to ED for abnormal Ca+ lvl     EXTERNAL RECORDS REVIEWED  Outpatient Notes recent head neck ultrasound    HPI/ROS  LIMITATION TO HISTORY   Select: : None  OUTSIDE HISTORIAN(S):  None    Thomas Slater is a 80 y.o. male who presents to the ED for evaluation of a few months of diarrhea without blood is associated with abdominal pain without back or flank pain denies any chest pain shortness of breath does admit some confusion on occasion.    PAST MEDICAL HISTORY  Past Medical History[1]    SURGICAL HISTORY  Past Surgical History[2]    FAMILY HISTORY  Family History   Problem Relation Age of Onset    Heart Disease Neg Hx        SOCIAL HISTORY   reports that he quit smoking about 52 years ago. His smoking use included cigarettes. He has never used smokeless tobacco. He reports that he does not currently use drugs. He reports that he does not drink alcohol.    CURRENT MEDICATIONS  Previous Medications    AMLODIPINE (NORVASC) 5 MG TAB    Take 5 mg by mouth every day.    APOAEQUORIN (PREVAGEN PO)    Take  by mouth.    ASPIRIN EC (ECOTRIN) 81 MG TABLET DELAYED RESPONSE    Take 81 mg by mouth every day.    ATORVASTATIN (LIPITOR) 10 MG TAB    Take 10 mg by mouth every evening.    B COMPLEX-C (SUPER B COMPLEX PO)    Take  by mouth.    COENZYME Q10 (CO Q 10 PO)    Take  by mouth.    FISH OIL-KRILL OIL (OMEGA-3 DUAL SPECTRUM PO)    Take  by mouth.    GLUCOSAMINE-CHONDROITIN-MSM-D (TRIPLE FLEX/VITAMIN D3 PO)    Take  by mouth.    INULIN (FIBER CHOICE FRUITY BITES PO)    Take  by mouth.    LEVOTHYROXINE (SYNTHROID) 50 MCG TAB    Take 50 mcg by mouth every morning on an empty stomach.     "LISINOPRIL (PRINIVIL, ZESTRIL) 30 MG TABLET    Take 30 mg by mouth every day.    LOSARTAN (COZAAR) 100 MG TAB    Take 100 mg by mouth every day.    MULTIPLE VITAMIN (MULTI VITAMIN DAILY PO)    Take  by mouth.    MULTIPLE VITAMINS-MINERALS (CENTRUM SILVER PO)    Take  by mouth.    OMEGA 3-6-9 FATTY ACIDS (TRIPLE OMEGA-3-6-9 PO)    Take  by mouth.    UBIQUINONE (ULTRA COQ10 PO)    Take  by mouth.    ZN-PYG AFRI-NETTLE-SAW PALMET (SAW PALMETTO COMPLEX PO)    Take  by mouth.       ALLERGIES  Allergies[3]     PHYSICAL EXAM  /56   Pulse 79   Temp 36.8 °C (98.3 °F) (Temporal)   Resp (!) 27   Ht 1.676 m (5' 6\")   Wt 73.7 kg (162 lb 7.7 oz)   SpO2 98%   BMI 26.22 kg/m²    General: Elderly, nontoxic  Neuro: Awake alert and oriented, muscle strength sensation normal  Neck: Supple, FROM, no cervical spinal tenderness  Cardiac: Regular rate and rhythm  Pulmonary: Clear to auscultation bilaterally no distress  Abdomen: Minimal distention, no significant tenderness  Back: Nontender, no CVA tenderness, no spinal tenderness  Psych: Normal  Skin: Pink warm dry, no rash  Extremities: Full range of motion, compartments soft, muscle strength sensation intact 2+ pulses x 4    DIAGNOSTIC STUDIES/PROCEDURES  Labs:   Labs Reviewed   CBC WITH DIFFERENTIAL - Abnormal; Notable for the following components:       Result Value    WBC 18.2 (*)     RBC 4.03 (*)     Hemoglobin 12.8 (*)     Hematocrit 37.8 (*)     Neutrophils-Polys 85.70 (*)     Lymphocytes 5.80 (*)     Immature Granulocytes 1.00 (*)     Neutrophils (Absolute) 15.60 (*)     Monos (Absolute) 1.34 (*)     Immature Granulocytes (abs) 0.19 (*)     All other components within normal limits   COMP METABOLIC PANEL - Abnormal; Notable for the following components:    Potassium 3.5 (*)     Co2 9 (*)     Anion Gap 21.0 (*)     Glucose 101 (*)     Bun 53 (*)     Creatinine 3.06 (*)     Calcium 6.3 (*)     Correct Calcium 6.5 (*)     AST(SGOT) 69 (*)     Alkaline Phosphatase 106 " (*)     All other components within normal limits   IONIZED CALCIUM - Abnormal; Notable for the following components:    Ionized Calcium 0.90 (*)     All other components within normal limits   URINALYSIS - Abnormal; Notable for the following components:    Character Cloudy (*)     Ketones Trace (*)     Protein 100 (*)     Leukocyte Esterase Moderate (*)     Occult Blood Large (*)     All other components within normal limits   TROPONIN - Abnormal; Notable for the following components:    Troponin T 58 (*)     All other components within normal limits   PROBRAIN NATRIURETIC PEPTIDE, NT - Abnormal; Notable for the following components:    NT-proBNP 2523 (*)     All other components within normal limits   ESTIMATED GFR - Abnormal; Notable for the following components:    GFR (CKD-EPI) 20 (*)     All other components within normal limits   URINE MICROSCOPIC (W/UA) - Abnormal; Notable for the following components:    WBC 6-10 (*)     Epithelial Cells 11-20 (*)     Urine Casts >20 (*)     All other components within normal limits   LIPASE   LACTIC ACID   AMMONIA   TROPONIN     I have independently interpreted the above labs    EKG:   Results for orders placed or performed during the hospital encounter of 07/15/25   EKG (NOW)   Result Value Ref Range    Report       [facility] Emergency Dept[facility]    Test Date:  2025-07-15  Pt Name:    ÓSCAR ALMANZA              Department: [facility]  MRN:        [id number]                      Room:       -ROOM 4  Gender:     Male                         Technician: [id number]  :        1945                   Requested By:GILES STAPLETNO  Order #:    [id number]                    Reading MD:    Measurements  Intervals                                Axis  Rate:       87                           P:          66  AZ:         148                          QRS:        -45  QRSD:       141                          T:          29  QT:         425  QTc:         512    Interpretive Statements  Sinus rhythm  Multiple premature complexes, vent & supraven  RBBB and LAFB  Compared to ECG 12/31/2018 13:10:41  No significant changes       I have independently interpreted this EKG    Radiology:   This attending emergency physician has independently interpreted the diagnostic imaging associated with this visit and is awaiting the final reading from the radiologist.   Preliminary interpretation is a follows: Left-sided hydronephrosis secondary to UPJ stenosis versus other obstruction without stone in the ureter.  There is a left renal stone  Radiologist interpretation:   CT-ABDOMEN-PELVIS W/O   Final Result      1.  Atherosclerosis.   2.  Left renal stone.   3.  Left-sided hydronephrosis secondary to a UPJ stenosis versus parapelvic cyst formation.   4.  Enlarged prostate.   5.  No evidence for bowel obstruction, diverticulitis, or appendicitis.         DX-CHEST-PORTABLE (1 VIEW)   Final Result      Hypoinflation without other evidence for acute cardiopulmonary disease.           COURSE & MEDICAL DECISION MAKING         INITIAL ASSESSMENT, COURSE AND PLAN  Differential diagnoses include but not limited to: Intra-abdominal inflammatory process, dehydration, YADI, electrolyte derangement    Care Narrative: 80-year-old gentleman with diarrhea se minimal confusion.    8:17 PM - Patient was first seen and evaluated at bedside.  Labs and imaging planned.    CRITICAL CARE  The very real possibilty of a deterioration of this patient's condition required the highest level of my preparedness for sudden, emergent intervention.  I provided critical care services, which included medication orders, frequent reevaluations of the patient's condition and response to treatment, ordering and reviewing test results, and discussing the case with various consultants.  The critical care time associated with the care of the patient was 35 minutes. Review chart for interventions. This time is exclusive of  any other billable procedures.        ED COURSE AND ADDITIONAL PROBLEMS    1. Hypocalcemia Acute: EKG without life-threatening interval abnormality however the patient kept on telemetry throughout her stay.  No hemodynamic instability thankfully.  Multiple reevaluations..  Patient was repleted with IV calcium chloride.  Admitted to telemetry unit underneath the hospitalist for further management.  Believe this is secondary to the patient's ongoing diarrhea.  Which can be further evaluated in the hospital setting.   2. Acute abdominal pain Acute: CT abdomen pelvis positive for UPJ hydronephrosis and stenosis without ureterolithiasis.  Admitted to the hospital for further management.   3. Nausea and vomiting, unspecified vomiting type Acute: Antiemetic and admission to the hospital.   4. Diarrhea, unspecified type Acute: With abdominal pain, CT of the abdomen pelvis negative for acute inflammatory process of the abdomen pelvis.   5. Other fatigue Acute: Likely secondary to hypocalcemia.  Admitted for further management.   6. Hydronephrosis with ureteropelvic junction (UPJ) obstruction Acute: Admitted for specialist consultation.  Discussed with Dr. Pierce.  He will follow-up in the morning.   7. Hematuria, unspecified type Acute: Without microscopic RBCs.  No evidence of infection.   8.  Acute kidney injury: Moving forward with fluid hydration since I do believe the YADI secondary to prerenal azotemia.  However we did discuss with urologist as it may be obstructive in nature.    Medications   calcium GLUConate 1 g in NaCl IVPB premix (1 g Intravenous New Bag 7/15/25 2211)   atorvastatin (Lipitor) tablet 10 mg (has no administration in time range)   levothyroxine (Synthroid) tablet 112 mcg (has no administration in time range)   acetaminophen (Tylenol) tablet 650 mg (has no administration in time range)   senna-docusate (Pericolace Or Senokot S) 8.6-50 MG per tablet 2 Tablet (has no administration in time range)      And   polyethylene glycol/lytes (Miralax) Packet 1 Packet (has no administration in time range)   sodium bicarbonate 150 mEq in D5W infusion (premix) (has no administration in time range)   ondansetron (Zofran) syringe/vial injection 4 mg (4 mg Intravenous Given 7/15/25 1915)   calcium CHLORIDE 10 % 1,000 mg in dextrose 5% 100 mL IVPB (0 mg Intravenous Stopped 7/15/25 2054)   NS infusion 1,000 mL (1,000 mL Intravenous New Bag 7/15/25 2211)       DISPOSITION AND DISCUSSIONS    I have discussed management of the patient with the following physicians and KLEVER's: Dr. Rendon.  Dr. Pierce urology.    Discussion of management with other QHP or appropriate source(s): CT technologist, pharmacy        DISPOSITION:  Patient will be hospitalized by Dr. Rendon in guarded condition      FINAL DIAGNOSIS  1. Hypocalcemia Acute   2. Acute abdominal pain Acute   3. Nausea and vomiting, unspecified vomiting type Acute   4. Diarrhea, unspecified type Acute   5. Other fatigue Acute   6. Hydronephrosis with ureteropelvic junction (UPJ) obstruction Acute   7. Hematuria, unspecified type Acute   8. Acute kidney injury (HCC)    9.  Critical care     IJimmie D.O. (Scribe), am scribing for, and in the presence of, Jimmie Nielsen D.O..    Electronically signed by: Jimmie Nielsen D.O. (Scribe), 7/15/2025    IJimmie D.O. personally performed the services described in this documentation, as scribed by Jimmie Nielsen D.O. in my presence, and it is both accurate and complete.      The note accurately reflects work and decisions made by me.  Jimmie Nielsen D.O.  7/15/2025  9:43 PM         [1]   Past Medical History:  Diagnosis Date    Abnormal EKG     Hyperlipidemia     Hypertension    [2] History reviewed. No pertinent surgical history.  [3] No Known Allergies

## 2025-07-16 NOTE — ASSESSMENT & PLAN NOTE
Marked and severe needing ICU admission with Magnesium of 0.4  Pt has had diarrhea for 3 years plus per pt  Assume combination of diarrhea, dehydration, decrease po intake?  Electrolytes replaced

## 2025-07-16 NOTE — ASSESSMENT & PLAN NOTE
"Dehydration vs secondary to left renal stone or both  Acute on chronic  Baseline creatinine 1.23  Stabilized with hydration  Appreciate urology consult   \"  - If creatinine rises, would consider L ureteral stent placement vs. L NPT  - If creatinine improving, would recommend eventual CT-urogram to better assess possible UPJ obstruction vs. Cyst\"    Urinalysis concerning for infection but so far blood cxs negative. No symptoms so will stop Abx    "

## 2025-07-16 NOTE — ASSESSMENT & PLAN NOTE
- Chronic finding but now EKG with additional ectopy, likely due to underlying electrolytes abnormalities.

## 2025-07-16 NOTE — ED NOTES
Patient up to bathroom standby assist. Urine void 30 ml, specimen collected, sent to lab. Patient back to [facility].

## 2025-07-16 NOTE — ASSESSMENT & PLAN NOTE
This is Sepsis Present on admission  SIRS criteria identified on my evaluation include: Tachycardia, with heart rate greater than 90 BPM and Leukocytosis, with WBC greater than 12,000  Clinical indicators of end organ dysfunction include Acute On Chronic Renal Failure, with creatinine >0.5 above baseline level  Source is urine  Sepsis protocol initiated  Crystalloid Fluid Administration: Fluid resuscitation ordered per standard protocol - 30 mL/kg per current or ideal body weight  IV antibiotics as appropriate for source of sepsis  Reassessment: I have reassessed the patient's hemodynamic status    Pressors not needed  Improving with hydration  Awaiting culture results  On Zosyn

## 2025-07-16 NOTE — CONSULTS
UROLOGY Consult Note:    Arianne Zaman P.A.-C.  Date & Time note created:    7/16/2025   1:00 PM     Referring MD:  Dr. Nielsen    Patient ID:  Name:             Thomas Slater   YOB: 1945  Age:                 80 y.o.  male   MRN:               [id number]                                                             Reason for Consult:      Left hydronephrosis    History of Present Illness:    Per my chart review, patient is an 80yoM with PMH including HTN, HLD, and CKD stage 3 who was sent to the ER by PCP on 7/15/2025 with abnormal lab results - metabolic acidosis and severe hypocalcemia. Urology was consulted when CT incidentally demonstrated left hydronephrosis in the setting of ARF, in the absence of ureteral stone.     On exam, patient is sitting up comfortably in bed. He denies any flank pain. Reports occasional R-sided back pain with physical activity relieved by massage. He states he did have a possible kidney injury 2/2 trauma in the [employer] several decades ago. Denies any voiding difficulty or UTI symptoms.     Review of Systems:      Constitutional: Denies fevers  Eyes: Denies changes in vision, no eye pain  Ears/Nose/Throat/Mouth: Denies nasal congestion or sore throat   Cardiovascular: no chest pain, no palpitations   Respiratory: no shortness of breath , Denies cough  Gastrointestinal/Hepatic: see HPI  Genitourinary: See HPI  Musculoskeletal/Rheum: Denies joint pain and swelling, no edema  Skin: Denies rash  Neurological: Denies headache, confusion, memory loss or focal weakness/parasthesias  Psychiatric: denies mood disorder   Endocrine: Elizabeth thyroid problems  Heme/Oncology/Lymph Nodes: Denies enlarged lymph nodes, denies brusing or known bleeding disorder  All other systems were reviewed and are negative (AMA/CMS criteria)                Past Medical History:   Past Medical History[1]  Active Hospital Problems    Diagnosis     Acute renal failure (ARF) (HCC) [N17.9]      Hydronephrosis [N13.30]     Sepsis secondary to UTI (HCC) [A41.9, N39.0]     Hypocalcemia [E83.51]     Hypomagnesemia [E83.42]     Normocytic anemia [D64.9]     Atrial fibrillation (HCC) [I48.91]     Electrolyte abnormality [E87.8]     Metabolic acidosis [E87.20]     Elevated troponin [R79.89]     Hypertension [I10]     Hyperlipidemia [E78.5]     Stage 3a chronic kidney disease [N18.31]     Hypothyroid [E03.9]     RBBB [I45.10]        Past Surgical History:  Past Surgical History[2]    Hospital Medications:  Current Medications[3]    Current Outpatient Medications:  Prescriptions Prior to Admission[4]    Medication Allergy:  Allergies[5]    Family History:  Family History   Problem Relation Age of Onset    Heart Disease Neg Hx        Social History:  Social History     Socioeconomic History    Marital status: Single     Spouse name: Not on file    Number of children: Not on file    Years of education: Not on file    Highest education level: Not on file   Occupational History    Not on file   Tobacco Use    Smoking status: Former     Current packs/day: 0.00     Types: Cigarettes     Quit date: 1972     Years since quittin.5    Smokeless tobacco: Never   Substance and Sexual Activity    Alcohol use: No    Drug use: Not Currently    Sexual activity: Not on file   Other Topics Concern    Not on file   Social History Narrative    Not on file     Social Drivers of Health     Financial Resource Strain: Not on file   Food Insecurity: No Food Insecurity (2025)    Hunger Vital Sign     Worried About Running Out of Food in the Last Year: Never true     Ran Out of Food in the Last Year: Never true   Transportation Needs: No Transportation Needs (2025)    PRAPARE - Transportation     Lack of Transportation (Medical): No     Lack of Transportation (Non-Medical): No   Physical Activity: Not on file   Stress: Not on file   Social Connections: Not on file   Intimate Partner Violence: Not At Risk (7/15/2025)     "Humiliation, Afraid, Rape, and Kick questionnaire     Fear of Current or Ex-Partner: No     Emotionally Abused: No     Physically Abused: No     Sexually Abused: No   Housing Stability: Low Risk  (7/16/2025)    Housing Stability Vital Sign     Unable to Pay for Housing in the Last Year: No     Number of Times Moved in the Last Year: 0     Homeless in the Last Year: No         Physical Exam:  Vitals/ General Appearance:   Weight/BMI: Body mass index is 24.41 kg/m².  /59   Pulse 96   Temp 36.9 °C (98.4 °F) (Temporal)   Resp 20   Ht 1.74 m (5' 8.5\")   Wt 73.9 kg (162 lb 14.7 oz)   SpO2 94%   Vitals:    07/16/25 1100 07/16/25 1115 07/16/25 1130 07/16/25 1145   BP: (!) 140/73 106/66 113/62 105/59   Pulse: (!) 128 (!) 118 (!) 110 96   Resp: (!) 31 (!) 24 (!) 21 20   Temp:       TempSrc:       SpO2: 93% 95% 95% 94%   Weight:       Height:         Oxygen Therapy:  Pulse Oximetry: 94 %, O2 (LPM): 0, O2 Delivery Device: None - Room Air    Constitutional:  No acute distress  HENMT:  Normocephalic, Atraumatic  Eyes:  EOMI  Neck:  Normal range of motion  Lungs:  Normal respiratory effort  : No caceres catheter  Skin: Warm, Dry  Neurologic: No focal deficits  Psychiatric: Affect normal, Judgment normal, Mood normal.      MDM (Data Review):     Records reviewed and summarized in current documentation    Lab Data Review:  Recent Results (from the past 24 hours)   CBC WITH DIFFERENTIAL    Collection Time: 07/15/25  6:10 PM   Result Value Ref Range    WBC 18.2 (H) 4.8 - 10.8 K/uL    RBC 4.03 (L) 4.70 - 6.10 M/uL    Hemoglobin 12.8 (L) 14.0 - 18.0 g/dL    Hematocrit 37.8 (L) 42.0 - 52.0 %    MCV 93.8 81.4 - 97.8 fL    MCH 31.8 27.0 - 33.0 pg    MCHC 33.9 32.3 - 36.5 g/dL    RDW 44.9 35.9 - 50.0 fL    Platelet Count 188 164 - 446 K/uL    MPV 12.0 9.0 - 12.9 fL    Neutrophils-Polys 85.70 (H) 44.00 - 72.00 %    Lymphocytes 5.80 (L) 22.00 - 41.00 %    Monocytes 7.40 0.00 - 13.40 %    Eosinophils 0.00 0.00 - 6.90 %    Basophils " 0.10 0.00 - 1.80 %    Immature Granulocytes 1.00 (H) 0.00 - 0.90 %    Nucleated RBC 0.00 0.00 - 0.20 /100 WBC    Neutrophils (Absolute) 15.60 (H) 1.82 - 7.42 K/uL    Lymphs (Absolute) 1.05 1.00 - 4.80 K/uL    Monos (Absolute) 1.34 (H) 0.00 - 0.85 K/uL    Eos (Absolute) 0.00 0.00 - 0.51 K/uL    Baso (Absolute) 0.02 0.00 - 0.12 K/uL    Immature Granulocytes (abs) 0.19 (H) 0.00 - 0.11 K/uL    NRBC (Absolute) 0.00 K/uL   COMP METABOLIC PANEL    Collection Time: 07/15/25  6:10 PM   Result Value Ref Range    Sodium 141 135 - 145 mmol/L    Potassium 3.5 (L) 3.6 - 5.5 mmol/L    Chloride 111 96 - 112 mmol/L    Co2 9 (LL) 20 - 33 mmol/L    Anion Gap 21.0 (H) 7.0 - 16.0    Glucose 101 (H) 65 - 99 mg/dL    Bun 53 (H) 8 - 22 mg/dL    Creatinine 3.06 (H) 0.50 - 1.40 mg/dL    Calcium 6.3 (LL) 8.5 - 10.5 mg/dL    Correct Calcium 6.5 (LL) 8.5 - 10.5 mg/dL    AST(SGOT) 69 (H) 12 - 45 U/L    ALT(SGPT) 40 2 - 50 U/L    Alkaline Phosphatase 106 (H) 30 - 99 U/L    Total Bilirubin 0.4 0.1 - 1.5 mg/dL    Albumin 3.7 3.2 - 4.9 g/dL    Total Protein 6.8 6.0 - 8.2 g/dL    Globulin 3.1 1.9 - 3.5 g/dL    A-G Ratio 1.2 g/dL   LIPASE    Collection Time: 07/15/25  6:10 PM   Result Value Ref Range    Lipase 52 11 - 82 U/L   IONIZED CALCIUM    Collection Time: 07/15/25  6:10 PM   Result Value Ref Range    Ionized Calcium 0.90 (L) 1.10 - 1.30 mmol/L   TROPONIN    Collection Time: 07/15/25  6:10 PM   Result Value Ref Range    Troponin T 58 (H) 6 - 19 ng/L   proBrain Natriuretic Peptide, NT    Collection Time: 07/15/25  6:10 PM   Result Value Ref Range    NT-proBNP 2523 (H) 0 - 125 pg/mL   ESTIMATED GFR    Collection Time: 07/15/25  6:10 PM   Result Value Ref Range    GFR (CKD-EPI) 20 (A) >60 mL/min/1.73 m 2   LACTIC ACID    Collection Time: 07/15/25  7:25 PM   Result Value Ref Range    Lactic Acid 0.9 0.5 - 2.0 mmol/L   AMMONIA    Collection Time: 07/15/25  7:25 PM   Result Value Ref Range    Ammonia 27 11 - 45 umol/L   EKG (NOW)    Collection Time:  07/15/25  8:03 PM   Result Value Ref Range    Report       [facility] Emergency Dept.    Test Date:  2025-07-15  Pt Name:    ÓSCAR ALMANZA              Department: [facility]  MRN:        [id number]                      [facility]  Gender:     Male                         Technician: [id number]  :        1945                   Requested By:GILES STAPLETON  Order #:    [id number]                    Reading MD:    Measurements  Intervals                                Axis  Rate:       87                           P:          66  MN:         148                          QRS:        -45  QRSD:       141                          T:          29  QT:         425  QTc:        512    Interpretive Statements  Sinus rhythm  Multiple premature complexes, vent & supraven  RBBB and LAFB  Compared to ECG 2018 13:10:41  No significant changes     URINALYSIS (UA)    Collection Time: 07/15/25  8:35 PM    Specimen: Urine   Result Value Ref Range    Color Dark Yellow     Character Cloudy (A)     Specific Gravity 1.022 <1.035    Ph 5.5 5.0 - 8.0    Glucose Negative Negative mg/dL    Ketones Trace (A) Negative mg/dL    Protein 100 (A) Negative mg/dL    Bilirubin Negative Negative    Urobilinogen, Urine 1.0 <=1.0 EU/dL    Nitrite Negative Negative    Leukocyte Esterase Moderate (A) Negative    Occult Blood Large (A) Negative    Micro Urine Req Microscopic    URINE MICROSCOPIC (W/UA)    Collection Time: 07/15/25  8:35 PM   Result Value Ref Range    WBC 6-10 (A) /hpf    RBC 0-2 /hpf    Bacteria None Seen None /hpf    Epithelial Cells 11-20 (A) 0 - 5 /hpf    Urine Casts >20 (A) 0 - 2 /lpf    Hyaline Cast Present /lpf   TROPONIN    Collection Time: 07/15/25  9:25 PM   Result Value Ref Range    Troponin T 49 (H) 6 - 19 ng/L   IONIZED CALCIUM    Collection Time: 07/15/25 10:39 PM   Result Value Ref Range    Ionized Calcium 0.99 (L) 1.10 - 1.30 mmol/L   TROPONIN    Collection Time: 07/15/25 11:45 PM    Result Value Ref Range    Troponin T 48 (H) 6 - 19 ng/L   CALCIUM (CA)    Collection Time: 07/15/25 11:45 PM   Result Value Ref Range    Calcium 6.5 (LL) 8.5 - 10.5 mg/dL   Basic Metabolic Panel (BMP)    Collection Time: 07/15/25 11:45 PM   Result Value Ref Range    Sodium 140 135 - 145 mmol/L    Potassium 3.1 (L) 3.6 - 5.5 mmol/L    Chloride 110 96 - 112 mmol/L    Co2 11 (L) 20 - 33 mmol/L    Glucose 218 (H) 65 - 99 mg/dL    Bun 50 (H) 8 - 22 mg/dL    Creatinine 2.44 (H) 0.50 - 1.40 mg/dL    Calcium 6.5 (LL) 8.5 - 10.5 mg/dL    Anion Gap 19.0 (H) 7.0 - 16.0   Magnesium    Collection Time: 07/15/25 11:45 PM   Result Value Ref Range    Magnesium 0.4 (LL) 1.5 - 2.5 mg/dL   PHOSPHORUS    Collection Time: 07/15/25 11:45 PM   Result Value Ref Range    Phosphorus 3.0 2.5 - 4.5 mg/dL   ESTIMATED GFR    Collection Time: 07/15/25 11:45 PM   Result Value Ref Range    GFR (CKD-EPI) 26 (A) >60 mL/min/1.73 m 2   CBC without Differential    Collection Time: 07/16/25  2:00 AM   Result Value Ref Range    WBC 14.3 (H) 4.8 - 10.8 K/uL    RBC 3.36 (L) 4.70 - 6.10 M/uL    Hemoglobin 10.6 (L) 14.0 - 18.0 g/dL    Hematocrit 31.2 (L) 42.0 - 52.0 %    MCV 92.9 81.4 - 97.8 fL    MCH 31.5 27.0 - 33.0 pg    MCHC 34.0 32.3 - 36.5 g/dL    RDW 43.1 35.9 - 50.0 fL    Platelet Count 149 (L) 164 - 446 K/uL    MPV 11.9 9.0 - 12.9 fL   Basic Metabolic Panel    Collection Time: 07/16/25  3:55 AM   Result Value Ref Range    Sodium 141 135 - 145 mmol/L    Potassium 3.2 (L) 3.6 - 5.5 mmol/L    Chloride 114 (H) 96 - 112 mmol/L    Co2 9 (LL) 20 - 33 mmol/L    Glucose 103 (H) 65 - 99 mg/dL    Bun 50 (H) 8 - 22 mg/dL    Creatinine 2.32 (H) 0.50 - 1.40 mg/dL    Calcium 6.9 (LL) 8.5 - 10.5 mg/dL    Anion Gap 18.0 (H) 7.0 - 16.0   MAGNESIUM    Collection Time: 07/16/25  3:55 AM   Result Value Ref Range    Magnesium 1.6 1.5 - 2.5 mg/dL   PHOSPHORUS    Collection Time: 07/16/25  3:55 AM   Result Value Ref Range    Phosphorus 2.8 2.5 - 4.5 mg/dL   Blood Culture     Collection Time: 07/16/25  3:55 AM    Specimen: Peripheral; Blood   Result Value Ref Range    Significant Indicator NEG     Source BLD     Site PERIPHERAL     Culture Result       No Growth  Note: Blood cultures are incubated for 5 days and  are monitored continuously.Positive blood cultures  are called to the RN and reported as soon as  they are identified.  Blood culture testing and Gram stain, if indicated, are  performed at [facility],  [address] [place] [address].  Positive blood  cultures are sent to [facility],  [address] [place] [address], for organism identification  and susceptibility testing.     PROCALCITONIN    Collection Time: 07/16/25  3:55 AM   Result Value Ref Range    Procalcitonin 0.60 (H) <0.25 ng/mL   LACTIC ACID    Collection Time: 07/16/25  3:55 AM   Result Value Ref Range    Lactic Acid 0.7 0.5 - 2.0 mmol/L   Prothrombin Time    Collection Time: 07/16/25  3:55 AM   Result Value Ref Range    PT 25.3 (H) 12.0 - 14.6 sec    INR 2.22 (H) 0.87 - 1.13   ESTIMATED GFR    Collection Time: 07/16/25  3:55 AM   Result Value Ref Range    GFR (CKD-EPI) 28 (A) >60 mL/min/1.73 m 2   Blood Culture    Collection Time: 07/16/25  4:09 AM    Specimen: Line; Blood   Result Value Ref Range    Significant Indicator NEG     Source BLD     Site Peripheral     Culture Result       No Growth  Note: Blood cultures are incubated for 5 days and  are monitored continuously.Positive blood cultures  are called to the RN and reported as soon as  they are identified.  Blood culture testing and Gram stain, if indicated, are  performed at [facility],  [address] [place] [address].  Positive blood  cultures are sent to [facility],  [address] [place] [address], for organism identification  and susceptibility testing.     Basic Metabolic Panel    Collection Time: 07/16/25  8:30 AM   Result Value Ref Range    Sodium 140  135 - 145 mmol/L    Potassium 3.7 3.6 - 5.5 mmol/L    Chloride 114 (H) 96 - 112 mmol/L    Co2 11 (L) 20 - 33 mmol/L    Glucose 128 (H) 65 - 99 mg/dL    Bun 45 (H) 8 - 22 mg/dL    Creatinine 2.03 (H) 0.50 - 1.40 mg/dL    Calcium 7.1 (L) 8.5 - 10.5 mg/dL    Anion Gap 15.0 7.0 - 16.0   MAGNESIUM    Collection Time: 07/16/25  8:30 AM   Result Value Ref Range    Magnesium 1.2 (L) 1.5 - 2.5 mg/dL   PHOSPHORUS    Collection Time: 07/16/25  8:30 AM   Result Value Ref Range    Phosphorus 2.1 (L) 2.5 - 4.5 mg/dL   ESTIMATED GFR    Collection Time: 07/16/25  8:30 AM   Result Value Ref Range    GFR (CKD-EPI) 32 (A) >60 mL/min/1.73 m 2       Imaging/Procedures Review:    Reviewed    MDM (Assessment and Plan):     Active Hospital Problems    Diagnosis     Acute renal failure (ARF) (HCC) [N17.9]     Hydronephrosis [N13.30]     Sepsis secondary to UTI (HCC) [A41.9, N39.0]     Hypocalcemia [E83.51]     Hypomagnesemia [E83.42]     Normocytic anemia [D64.9]     Atrial fibrillation (HCC) [I48.91]     Electrolyte abnormality [E87.8]     Metabolic acidosis [E87.20]     Elevated troponin [R79.89]     Hypertension [I10]     Hyperlipidemia [E78.5]     Stage 3a chronic kidney disease [N18.31]     Hypothyroid [E03.9]     RBBB [I45.10]      80yoM admitted for metabolic acidosis, electrolyte abnormalities, ARF, sepsis - urology consulted when patient found to have left hydronephrosis 2/2 possible UPJ-O vs. L parapelvic cyst on CT imaging in the ER.     Creat improving 2.03 (2.32, 2.44, 3.06), with baseline ~1.26  WBC 18.2, on Zosyn  Urine culture pending  Blood cultures pending  Afebrile, tachycardic and tachypnic, normal BP    I reviewed CT imaging, also with non-obstructing 13mm L renal stone and enlarged prostate  Of note, prior RBUS in 2020 did demonstrate L hydronephrosis as well, suggesting possible chronic finding    Plan:    - Trend renal function  - Monitor clinically  - If creatinine rises, would consider L ureteral stent placement  vs. L NPT  - If creatinine improving, would recommend eventual CT-urogram to better assess possible UPJ obstruction vs. cyst  - Appreciate this consult, urology will follow    Dr. Pierce and Dr. Mora are aware of this consultation and have directed the plan of care.    Arianne Zaman PA-C  [facility] [place]             [1]   Past Medical History:  Diagnosis Date    Abnormal EKG     Hyperlipidemia     Hypertension    [2] History reviewed. No pertinent surgical history.  [3]   Current Facility-Administered Medications:     [COMPLETED] piperacillin-tazobactam (Zosyn) 3.375 g in  mL IVPB, 3.375 g, Intravenous, Once, Stopped at 07/16/25 0440 **AND** piperacillin-tazobactam (Zosyn) 3.375 g in  mL IVPB, 3.375 g, Intravenous, Q8HRS, Navya Perry M.D., Stopped at 07/16/25 1239    LR (Bolus) infusion 500 mL, 500 mL, Intravenous, Once PRN, Navya Perry M.D.    amiodarone (Nexterone) 360 mg/200 mL infusion, 1 mg/min, Intravenous, Once, Last Rate: 33.3 mL/hr at 07/16/25 0946, 1 mg/min at 07/16/25 0946 **FOLLOWED BY** amiodarone (Nexterone) 360 mg/200 mL infusion, 0.5 mg/min, Intravenous, Continuous, Nader Marlow M.D.    dextrose 5% infusion, , Intravenous, Continuous, Nader Marlow M.D., Last Rate: 30 mL/hr at 07/16/25 0946, Rate Verify at 07/16/25 0946    apixaban (Eliquis) tablet 2.5 mg, 2.5 mg, Oral, BID, Nader Marlow M.D., 2.5 mg at 07/16/25 0816    sodium bicarbonate tablet 650 mg, 650 mg, Oral, TID, Nader Marlow M.D., 650 mg at 07/16/25 1007    atorvastatin (Lipitor) tablet 10 mg, 10 mg, Oral, Nightly, Navya Perry M.D.    levothyroxine (Synthroid) tablet 112 mcg, 112 mcg, Oral, AM ES, Navya Perry M.D., 112 mcg at 07/16/25 0544    acetaminophen (Tylenol) tablet 650 mg, 650 mg, Oral, Q6HRS PRN, Navya Perry M.D.    senna-docusate (Pericolace Or Senokot S) 8.6-50 MG per tablet 2 Tablet, 2 Tablet, Oral, Q EVENING **AND**  "polyethylene glycol/lytes (Miralax) Packet 1 Packet, 1 Packet, Oral, QDAY PRN, Navya Perry M.D.  [4]   Medications Prior to Admission   Medication Sig Dispense Refill Last Dose/Taking    vitamin D3 (CHOLECALCIFEROL) 25 MCG (1000 UT) Tab Take 5,000 Units by mouth every day.   7/14/2025 Morning    levothyroxine (SYNTHROID) 112 MCG Tab Take 112 mcg by mouth every morning on an empty stomach.   7/14/2025 Morning    amLODIPine (NORVASC) 5 MG Tab Take 5 mg by mouth every day.   7/14/2025 Morning    losartan (COZAAR) 100 MG Tab Take 100 mg by mouth every day.   7/14/2025 Morning    Multiple Vitamins-Minerals (CENTRUM SILVER PO) Take  by mouth. \"150 multivitamin\"   7/14/2025 Morning    aspirin EC (ECOTRIN) 81 MG Tablet Delayed Response Take 81 mg by mouth every day.   7/14/2025 Morning    atorvastatin (LIPITOR) 10 MG Tab Take 10 mg by mouth every evening.   7/14/2025 Morning   [5] No Known Allergies    "

## 2025-07-16 NOTE — PROGRESS NOTES
4 Eyes Skin Assessment Completed by PATRICA Manning and PATRICA Morrison.    Skin assessment is primarily focused on high risk bony prominences. Pay special attention to skin beneath and around medical devices, high risk bony prominences, skin to skin areas and areas where the patient lacks sensation to feel pain and areas where the patient previously had breakdown.     Head (Occipital):  WDL   Ears (Under Medical Devices): WDL   Nose (Under Medical Devices): WDL   Mouth:  WDL   Neck: WDL   Breast/Chest:  WDL   Shoulder Blades:  WDL   Spine:   WDL   (R) Arm/Elbow/Hand: Scab   (L) Arm/Elbow/Hand: Scab   Abdomen: WDL   Pannus/Groin:  WDL   Sacrum/Coccyx:   WDL   (R) Ischial Tuberosity (Sit Bones):  WDL   (L) Ischial Tuberosity (Sit Bones):  WDL   (R) Leg:  Edema and Scar   (L) Leg:  Edema and Scar   (R) Heel:  Red and Blanching   (R) Foot/Toe: Scratch and Edema   (L) Heel: Red and Blanching   (L) Foot/Toe:  Red and Edema       DEVICES IN USE:   Respiratory Devices:  Pulse ox  Feeding Devices:  N/A   Lines & BP Monitoring Devices:  Peripheral IV, BP cuff, and Pulse ox    Orthopedic Devices:  N/A  Miscellaneous Devices:  SCDs    PROTOCOL INTERVENTIONS:   ICU Low Airloss Bed:  Applied this assessment  Float Heels with Pillows:  Applied this assessment  Glide Sheet:  Applied this assessment    WOUND PHOTOS:   N/A no wounds identified    WOUND CONSULT:   N/A, no advanced wound care needs identified

## 2025-07-16 NOTE — ED NOTES
Marely from Lab called with critical result of Calcium of 6.3, Corrected Calcium of 6.5, and CO2 of 9 at 1929. Critical lab result read back to Marely.   Dr. Nielsen notified of critical lab result at 1929.  Critical lab result read back by Dr. Nielsen.

## 2025-07-16 NOTE — PROGRESS NOTES
12-hour chart check complete.    Monitor Summary  Rhythm: SA/A Fib/A Flut  Rate: 70s-100s at rest, 150s-180s with activity, as high as 224 while urinating. Dr. Barnard was notified of irregular rhythm at 0040 and HR variability at 0253.  Ectopy: rare PVCs, rare trigem, rare couplets, and freq PACs  Measurements: -/0.12/-

## 2025-07-16 NOTE — ED TRIAGE NOTES
"Chief Complaint   Patient presents with    Diarrhea     Intermittent since April      Weakness     C/o increasing weakness    Abnormal Labs     Advised by PCP to come to ED for abnormal Ca+ lvl     /58   Pulse 81   Temp 36.8 °C (98.3 °F) (Temporal)   Resp (!) 24   Ht 1.676 m (5' 6\")   Wt 73.7 kg (162 lb 7.7 oz)   SpO2 94%   BMI 26.22 kg/m²     Pt ambulated to ED by self for c/o ongoing intermittent diarrhea and increasing weakness.  Pt states has been following w/ PCP for c/o same, had labs drawn today and was told Ca+ lvls were abnormal.  Pt denies any recent antibx use.    "

## 2025-07-16 NOTE — ASSESSMENT & PLAN NOTE
Creatinine was 2.6 on admission, has been slowly increasing, 4.06 today.  CT abdomen with pelvis showed  left renal stone, left-sided hydronephrosis secondary to a UPJ stenosis versus parapelvic cyst formation.  UA showed no evidence of bacteria.  I have reached out to urology for recommendations.

## 2025-07-16 NOTE — PROGRESS NOTES
"Pulmonary Progress Note    Date of admission  7/15/2025    Chief Complaint  80 y.o. male admitted 7/15/2025 with abnormal electrolytes - referred by his PCP    Hospital Course  \"80 y.o. male, with h/o HTN, HLD, CKD3 who was sent to the ER by PCP on 7/15/2025 with abnormal lab results, metabolic acidosis and severe hypocalcemia. \" From Dr. Barnard's note and also found to have marked hypomagnesmia  Electrolytes replaced  Pt also developed afib with RVR and now started on amiodarone and anticoagulated as CHAdVASC score of 3  Pt has been having chronic diarrhea and also having problems with nausea    Interval Problem Update  Reviewed last 24 hour events:  As above    Review of Systems  Review of Systems   Constitutional:  Positive for malaise/fatigue.   HENT:  Positive for hearing loss.    Eyes: Negative.    Respiratory: Negative.     Cardiovascular: Negative.    Gastrointestinal:  Positive for diarrhea.   Genitourinary: Negative.    Musculoskeletal: Negative.    Skin: Negative.    Neurological:  Positive for weakness.   Endo/Heme/Allergies: Negative.    Psychiatric/Behavioral: Negative.          Vital Signs for last 24 hours   Temp:  [36.4 °C (97.6 °F)-37.7 °C (99.8 °F)] 36.9 °C (98.4 °F)  Pulse:  [] 96  Resp:  [19-58] 20  BP: (103-159)/(55-99) 105/59  SpO2:  [92 %-98 %] 94 %            Physical Exam   Physical Exam  Constitutional:       Appearance: Normal appearance.   HENT:      Head: Normocephalic and atraumatic.      Mouth/Throat:      Mouth: Mucous membranes are moist.   Eyes:      Pupils: Pupils are equal, round, and reactive to light.   Cardiovascular:      Rate and Rhythm: Tachycardia present.   Abdominal:      General: Abdomen is flat. Bowel sounds are normal.      Palpations: Abdomen is soft.   Musculoskeletal:         General: Normal range of motion.      Cervical back: Normal range of motion.   Skin:     General: Skin is warm and dry.   Neurological:      General: No focal deficit present.      Mental " Status: He is alert and oriented to person, place, and time.   Psychiatric:         Mood and Affect: Mood normal.         Behavior: Behavior normal.         Thought Content: Thought content normal.         Judgment: Judgment normal.         Medications  Current Medications[1]    Fluids    Intake/Output Summary (Last 24 hours) at 7/16/2025 1228  Last data filed at 7/16/2025 1200  Gross per 24 hour   Intake 3933.52 ml   Output 500 ml   Net 3433.52 ml       Laboratory          Recent Labs     07/15/25  2345 07/16/25  0355 07/16/25  0830   SODIUM 140 141 140   POTASSIUM 3.1* 3.2* 3.7   CHLORIDE 110 114* 114*   CO2 11* 9* 11*   BUN 50* 50* 45*   CREATININE 2.44* 2.32* 2.03*   MAGNESIUM 0.4* 1.6 1.2*   PHOSPHORUS 3.0 2.8 2.1*   CALCIUM 6.5*  6.5* 6.9* 7.1*     Recent Labs     07/15/25  1132 07/15/25  1810 07/15/25  2345 07/16/25  0355 07/16/25  0830   ALTSGPT 32 40  --   --   --    ASTSGOT 52* 69*  --   --   --    ALKPHOSPHAT 92 106*  --   --   --    TBILIRUBIN 0.4 0.4  --   --   --    LIPASE  --  52  --   --   --    GLUCOSE 114* 101* 218* 103* 128*     Recent Labs     07/15/25  1132 07/15/25  1810 07/16/25  0200   WBC 18.3* 18.2* 14.3*   NEUTSPOLYS 83.90* 85.70*  --    LYMPHOCYTES 6.40* 5.80*  --    MONOCYTES 8.70 7.40  --    EOSINOPHILS 0.00 0.00  --    BASOPHILS 0.20 0.10  --    ASTSGOT 52* 69*  --    ALTSGPT 32 40  --    ALKPHOSPHAT 92 106*  --    TBILIRUBIN 0.4 0.4  --      Recent Labs     07/15/25  1132 07/15/25  1810 07/16/25  0200 07/16/25  0355   RBC 4.04* 4.03* 3.36*  --    HEMOGLOBIN 12.6* 12.8* 10.6*  --    HEMATOCRIT 37.8* 37.8* 31.2*  --    PLATELETCT 205 188 149*  --    PROTHROMBTM  --   --   --  25.3*   INR  --   --   --  2.22*   IRON 12*  --   --   --    FERRITIN 274.0  --   --   --    TOTIRONBC 195*  --   --   --        Imaging  No I or E    Assessment/Plan  * Metabolic acidosis- (present on admission)  Assessment & Plan  Due to YADI 2/2 renal stone +/- dehydration and UTI  Change IV bicarb to  po    Sepsis secondary to UTI (HCC)- (present on admission)  Assessment & Plan  This is Sepsis Present on admission  SIRS criteria identified on my evaluation include: Tachycardia, with heart rate greater than 90 BPM and Leukocytosis, with WBC greater than 12,000  Clinical indicators of end organ dysfunction include Acute On Chronic Renal Failure, with creatinine >0.5 above baseline level  Source is urine  Sepsis protocol initiated  Crystalloid Fluid Administration: Fluid resuscitation ordered per standard protocol - 30 mL/kg per current or ideal body weight  IV antibiotics as appropriate for source of sepsis  Reassessment: I have reassessed the patient's hemodynamic status    Pressors not needed  Improving with hydration  Awaiting culture results  On Zosyn    Electrolyte abnormality- (present on admission)  Assessment & Plan  Marked and severe needing ICU admission with Magnesium of 0.4  Pt has had diarrhea for 3 years plus per pt  Assume combination of diarrhea, dehydration, decrease po intake?  Electrolytes replaced    Atrial fibrillation (HCC)- (present on admission)  Assessment & Plan  With RVR  SIOBHAN VASC 2 score is 3  Started NOAC and also loaded on amiodarone and drip    Hypocalcemia- (present on admission)  Assessment & Plan  Replaced and monitor    Acute renal failure (ARF) (HCC)- (present on admission)  Assessment & Plan  Dehydration vs secondary to left renal stone or both  Acute on chronic  Baseline creatinine 1.23  Improving with hydration  Urology consulted overnight  Urinalysis concerning for infection  On Zosyn  Pt denies any pain      Elevated troponin- (present on admission)  Assessment & Plan  Mildly elevated - likely supply and demand and not clearing due to acute on chronic kidney injury    Hypothyroid- (present on admission)  Assessment & Plan  Continue home levothyroxine    Hyperlipidemia- (present on admission)  Assessment & Plan  Statin         VTE:  NOAC  Ulcer: Not Indicated  Lines:  None    I have performed a physical exam and reviewed and updated ROS and Plan today (7/16/2025). In review of yesterday's note (7/15/2025), there are no changes except as documented above.     Discussed patient condition and risk of morbidity and/or mortality with RN, RT, Pharmacy, Charge nurse / hot rounds, and Patient  The patient remains critically ill.  Critical care time = 33 minutes in directly providing and coordinating critical care and extensive data review.  No time overlap and excludes procedures.         [1]   Current Facility-Administered Medications   Medication Dose Route Frequency Provider Last Rate Last Admin    piperacillin-tazobactam (Zosyn) 3.375 g in  mL IVPB  3.375 g Intravenous Q8HRS Navya ePrry M.D.   Stopped at 07/16/25 1239    LR (Bolus) infusion 500 mL  500 mL Intravenous Once PRN Navya Perry M.D.        amiodarone (Nexterone) 360 mg/200 mL infusion  1 mg/min Intravenous Once Nader Marlow M.D. 33.3 mL/hr at 07/16/25 0946 1 mg/min at 07/16/25 0946    Followed by    amiodarone (Nexterone) 360 mg/200 mL infusion  0.5 mg/min Intravenous Continuous Nader Marlow M.D.        dextrose 5% infusion   Intravenous Continuous Nader Marlow M.D. 30 mL/hr at 07/16/25 0946 Rate Verify at 07/16/25 0946    apixaban (Eliquis) tablet 2.5 mg  2.5 mg Oral BID Nader Marlow M.D.   2.5 mg at 07/16/25 0816    sodium bicarbonate tablet 650 mg  650 mg Oral TID Nader Marlow M.D.   650 mg at 07/16/25 1007    atorvastatin (Lipitor) tablet 10 mg  10 mg Oral Nightly Navya Perry M.D.        levothyroxine (Synthroid) tablet 112 mcg  112 mcg Oral AM ES Navya Perry M.D.   112 mcg at 07/16/25 0544    acetaminophen (Tylenol) tablet 650 mg  650 mg Oral Q6HRS PRN Navya Perry M.D.        senna-docusate (Pericolace Or Senokot S) 8.6-50 MG per tablet 2 Tablet  2 Tablet Oral Q EVENING Navya Perry M.D.        And     polyethylene glycol/lytes (Miralax) Packet 1 Packet  1 Packet Oral QDAY PRN Navya Perry M.D.

## 2025-07-16 NOTE — PROGRESS NOTES
Bronson from Lab called with critical result of magnesium at 0106. Critical lab result read back to Bronson.   Dr. Barnard notified of critical lab result at 0120.  Critical lab result read back by Dr. Barnard.

## 2025-07-16 NOTE — ASSESSMENT & PLAN NOTE
CT abdomen and pelvis showed left renal stone, left-sided hydronephrosis secondary to a UPJ stenosis versus parapelvic cyst formation.     Per Urology evaluation on 7/18/2025, patient reportedly is supposed to be doing CIC 4 times a day at home, he was recommended to perform PVR every 4 hours and have a Johns catheter paced if he is unable to perform CIC 4 times a day. Flomax was started    Creatinine increased from 3.43 -> 4.06.  Patient had low urine output with 1200 cc on bladder scan.  Johns catheter placed.  Per urology, patient likely has bladder outlet obstruction.  Further recommendations per urology

## 2025-07-17 PROBLEM — D50.9 IRON DEFICIENCY ANEMIA: Status: ACTIVE | Noted: 2025-07-17

## 2025-07-17 PROBLEM — N39.0 SEPSIS SECONDARY TO UTI (HCC): Status: RESOLVED | Noted: 2025-07-16 | Resolved: 2025-07-17

## 2025-07-17 PROBLEM — A41.9 SEPSIS SECONDARY TO UTI (HCC): Status: RESOLVED | Noted: 2025-07-16 | Resolved: 2025-07-17

## 2025-07-17 ASSESSMENT — GAIT ASSESSMENTS
DISTANCE (FEET): 250
GAIT LEVEL OF ASSIST: CONTACT GUARD ASSIST

## 2025-07-17 ASSESSMENT — COGNITIVE AND FUNCTIONAL STATUS - GENERAL
HELP NEEDED FOR BATHING: A LITTLE
WALKING IN HOSPITAL ROOM: A LITTLE
MOBILITY SCORE: 22
CLIMB 3 TO 5 STEPS WITH RAILING: A LITTLE
CLIMB 3 TO 5 STEPS WITH RAILING: A LITTLE
WALKING IN HOSPITAL ROOM: A LITTLE
SUGGESTED CMS G CODE MODIFIER DAILY ACTIVITY: CI
DAILY ACTIVITIY SCORE: 23
SUGGESTED CMS G CODE MODIFIER MOBILITY: CJ
MOBILITY SCORE: 22
SUGGESTED CMS G CODE MODIFIER MOBILITY: CJ

## 2025-07-17 ASSESSMENT — ENCOUNTER SYMPTOMS
WEAKNESS: 1
EYES NEGATIVE: 1
PSYCHIATRIC NEGATIVE: 1
MUSCULOSKELETAL NEGATIVE: 1
DIARRHEA: 1
CARDIOVASCULAR NEGATIVE: 1
RESPIRATORY NEGATIVE: 1

## 2025-07-17 ASSESSMENT — PAIN DESCRIPTION - PAIN TYPE
TYPE: ACUTE PAIN
TYPE: ACUTE PAIN

## 2025-07-17 NOTE — PROGRESS NOTES
Pt ambulated in room with 2x staff assist. Pt has shuffle gate, staff advised pt to utilize walker. Pt requested to be placed in brief, own depends placed on pt. Sacral skin intact. Frame bed alarm in use, pt advised to use call light prior to attempting to get out of bed.

## 2025-07-17 NOTE — PROGRESS NOTES
"Pulmonary Progress Note    Date of admission  7/15/2025    Chief Complaint  80 y.o. male admitted 7/15/2025 with abnormal electrolytes - referred by his PCP    Hospital Course  \"80 y.o. male, with h/o HTN, HLD, CKD3 who was sent to the ER by PCP on 7/15/2025 with abnormal lab results, metabolic acidosis and severe hypocalcemia. \" From Dr. Barnard's note and also found to have marked hypomagnesmia  Electrolytes replaced  Pt also developed afib with RVR and now started on amiodarone and anticoagulated as CHAdVASC score of 3  Pt has been having chronic diarrhea and also having problems with nausea    7/16-7/17: responded well to amiodarone with regular heart rate and electrolytes improved with replacement; eating     Interval Problem Update  Reviewed last 24 hour events:  As above    Review of Systems  Review of Systems   Constitutional:  Positive for malaise/fatigue.   HENT:  Positive for hearing loss.    Eyes: Negative.    Respiratory: Negative.     Cardiovascular: Negative.    Gastrointestinal:  Positive for diarrhea.   Genitourinary: Negative.    Musculoskeletal: Negative.    Skin: Negative.    Neurological:  Positive for weakness.   Endo/Heme/Allergies: Negative.    Psychiatric/Behavioral: Negative.          Vital Signs for last 24 hours   Temp:  [36.2 °C (97.1 °F)-37 °C (98.6 °F)] 36.3 °C (97.4 °F)  Pulse:  [] 68  Resp:  [16-40] 19  BP: ()/(54-91) 144/70  SpO2:  [88 %-98 %] 96 %            Physical Exam   Physical Exam  Constitutional:       Appearance: Normal appearance.   HENT:      Head: Normocephalic and atraumatic.      Mouth/Throat:      Mouth: Mucous membranes are moist.   Eyes:      Pupils: Pupils are equal, round, and reactive to light.   Cardiovascular:      Rate and Rhythm: Normal rate.   Abdominal:      General: Abdomen is flat. Bowel sounds are normal.      Palpations: Abdomen is soft.   Musculoskeletal:         General: Normal range of motion.      Cervical back: Normal range of motion. "   Skin:     General: Skin is warm and dry.   Neurological:      General: No focal deficit present.      Mental Status: He is alert and oriented to person, place, and time.   Psychiatric:         Mood and Affect: Mood normal.         Behavior: Behavior normal.         Thought Content: Thought content normal.         Judgment: Judgment normal.         Medications  Current Medications[1]    Fluids    Intake/Output Summary (Last 24 hours) at 7/17/2025 0826  Last data filed at 7/17/2025 0800  Gross per 24 hour   Intake 2773.51 ml   Output 650 ml   Net 2123.51 ml       Laboratory          Recent Labs     07/16/25  0355 07/16/25  0830 07/16/25 2035   SODIUM 141 140 142   POTASSIUM 3.2* 3.7 3.8   CHLORIDE 114* 114* 113*   CO2 9* 11* 12*   BUN 50* 45* 47*   CREATININE 2.32* 2.03* 2.32*   MAGNESIUM 1.6 1.2* 1.7   PHOSPHORUS 2.8 2.1* 2.6   CALCIUM 6.9* 7.1* 7.3*     Recent Labs     07/15/25  1132 07/15/25  1810 07/15/25  2345 07/16/25 0355 07/16/25 0830 07/16/25 2035   ALTSGPT 32 40  --   --   --   --    ASTSGOT 52* 69*  --   --   --   --    ALKPHOSPHAT 92 106*  --   --   --   --    TBILIRUBIN 0.4 0.4  --   --   --   --    LIPASE  --  52  --   --   --   --    GLUCOSE 114* 101*   < > 103* 128* 121*    < > = values in this interval not displayed.     Recent Labs     07/15/25  1132 07/15/25  1810 07/16/25  0200   WBC 18.3* 18.2* 14.3*   NEUTSPOLYS 83.90* 85.70*  --    LYMPHOCYTES 6.40* 5.80*  --    MONOCYTES 8.70 7.40  --    EOSINOPHILS 0.00 0.00  --    BASOPHILS 0.20 0.10  --    ASTSGOT 52* 69*  --    ALTSGPT 32 40  --    ALKPHOSPHAT 92 106*  --    TBILIRUBIN 0.4 0.4  --      Recent Labs     07/15/25  1132 07/15/25  1810 07/16/25  0200 07/16/25  0355   RBC 4.04* 4.03* 3.36*  --    HEMOGLOBIN 12.6* 12.8* 10.6*  --    HEMATOCRIT 37.8* 37.8* 31.2*  --    PLATELETCT 205 188 149*  --    PROTHROMBTM  --   --   --  25.3*   INR  --   --   --  2.22*   IRON 12*  --   --   --    FERRITIN 274.0  --   --   --    TOTIRONBC 195*  --   --    "--        Imaging  No I or E    Assessment/Plan  * Metabolic acidosis- (present on admission)  Assessment & Plan  Due to YADI 2/2 renal stone +/- dehydration and UTI  Change IV bicarb to po    Iron deficiency anemia  Assessment & Plan  Iron levels 12  Start iron po    Electrolyte abnormality- (present on admission)  Assessment & Plan  Marked and severe needing ICU admission with Magnesium of 0.4  Pt has had diarrhea for 3 years plus per pt  Assume combination of diarrhea, dehydration, decrease po intake?  Electrolytes replaced    Atrial fibrillation (HCC)- (present on admission)  Assessment & Plan  With RVR  SIOBHAN VASC 2 score is 3  On eliquis and changed amiodarone to po     Hypocalcemia- (present on admission)  Assessment & Plan  Replaced and monitor    Acute renal failure (ARF) (HCC)- (present on admission)  Assessment & Plan  Dehydration vs secondary to left renal stone or both  Acute on chronic  Baseline creatinine 1.23  Stabilized with hydration  Appreciate urology consult   \"  - If creatinine rises, would consider L ureteral stent placement vs. L NPT  - If creatinine improving, would recommend eventual CT-urogram to better assess possible UPJ obstruction vs. Cyst\"    Urinalysis concerning for infection but so far blood cxs negative. No symptoms so will stop Abx      Elevated troponin- (present on admission)  Assessment & Plan  Mildly elevated - likely supply and demand and not clearing due to acute on chronic kidney injury    Hypothyroid- (present on admission)  Assessment & Plan  Continue home levothyroxine    Hyperlipidemia- (present on admission)  Assessment & Plan  Statin         VTE:  NOAC  Ulcer: Not Indicated  Lines: None    I have performed a physical exam and reviewed and updated ROS and Plan today (7/17/2025). In review of yesterday's note (7/16/2025), there are no changes except as documented above.     Discussed patient condition and risk of morbidity and/or mortality with RN, RT, Pharmacy, Charge " nurse / hot rounds, and Patient             [1]   Current Facility-Administered Medications   Medication Dose Route Frequency Provider Last Rate Last Admin    ferrous sulfate tablet 325 mg  325 mg Oral QDAY with Breakfast Nader Marlow M.D.        amiodarone (Cordarone) tablet 200 mg  200 mg Oral Q DAY Nader Marlow M.D.        LR (Bolus) infusion 500 mL  500 mL Intravenous Once PRN Navya Perry M.D.        dextrose 5% infusion   Intravenous Continuous Nader Marlow M.D. 30 mL/hr at 07/16/25 2007 Rate Verify at 07/16/25 2007    apixaban (Eliquis) tablet 2.5 mg  2.5 mg Oral BID Nader Marlow M.D.   2.5 mg at 07/17/25 0607    sodium bicarbonate tablet 650 mg  650 mg Oral TID Nader Marlow M.D.   650 mg at 07/16/25 2038    atorvastatin (Lipitor) tablet 10 mg  10 mg Oral Nightly Navya Perry M.D.   10 mg at 07/16/25 2038    levothyroxine (Synthroid) tablet 112 mcg  112 mcg Oral AM ES Navya Perry M.D.   112 mcg at 07/17/25 0607    acetaminophen (Tylenol) tablet 650 mg  650 mg Oral Q6HRS PRN Navya Perry M.D.        senna-docusate (Pericolace Or Senokot S) 8.6-50 MG per tablet 2 Tablet  2 Tablet Oral Q EVENING Navya Perry M.D.        And    polyethylene glycol/lytes (Miralax) Packet 1 Packet  1 Packet Oral QDAY PRN Navya Perry M.D.

## 2025-07-17 NOTE — CARE PLAN
The patient is Stable - Low risk of patient condition declining or worsening    Shift Goals  Clinical Goals: Monitor HR on tele and labs  Patient Goals: Get new room  Family Goals: None present    Progress made toward(s) clinical / shift goals:    Problem: Fall Risk  Goal: Patient will remain free from falls  Outcome: Progressing     Problem: Hemodynamics  Goal: Patient's hemodynamics, fluid balance and neurologic status will be stable or improve  Outcome: Progressing     Problem: Fluid Volume  Goal: Fluid volume balance will be maintained  Outcome: Progressing       Patient is not progressing towards the following goals:

## 2025-07-17 NOTE — PROGRESS NOTES
UROLOGY Progress Note    History of Present Illness:    Per my chart review, patient is an 80yoM with PMH including HTN, HLD, and CKD stage 3 who was sent to the ER by PCP on 7/15/2025 with abnormal lab results - metabolic acidosis and severe hypocalcemia. Urology was consulted when CT incidentally demonstrated left hydronephrosis in the setting of ARF, in the absence of ureteral stone.     Interval Updates:    7/17. Patient denies any new pain, denies L flank pain. Reports increased difficulty emptying the bladder while here in hospital, though feels he is able to empty adequately with no bladder pain/pressure. Discussed trial of Flomax. Reviewed labs, chart notes. Discussed case with Dr. Mora.    7/16. On exam, patient is sitting up comfortably in bed. He denies any flank pain. Reports occasional R-sided back pain with physical activity relieved by massage. He states he did have a possible kidney injury 2/2 trauma in the [employer] several decades ago. Denies any voiding difficulty or UTI symptoms.     Review of Systems:      Constitutional: Denies fevers  Eyes: Denies changes in vision, no eye pain  Ears/Nose/Throat/Mouth: Denies nasal congestion or sore throat   Cardiovascular: no chest pain, no palpitations   Respiratory: no shortness of breath , Denies cough  Gastrointestinal/Hepatic: see HPI  Genitourinary: See HPI  Musculoskeletal/Rheum: Denies joint pain and swelling, no edema  Skin: Denies rash  Neurological: Denies headache, confusion, memory loss or focal weakness/parasthesias  Psychiatric: denies mood disorder   Endocrine: Elizabeth thyroid problems  Heme/Oncology/Lymph Nodes: Denies enlarged lymph nodes, denies brusing or known bleeding disorder  All other systems were reviewed and are negative (AMA/CMS criteria)                Past Medical History:   Past Medical History[1]  Active Hospital Problems    Diagnosis     Iron deficiency anemia [D50.9]     Acute renal failure (ARF) (HCC) [N17.9]     Hydronephrosis  [N13.30]     Hypocalcemia [E83.51]     Hypomagnesemia [E83.42]     Normocytic anemia [D64.9]     Atrial fibrillation (HCC) [I48.91]     Electrolyte abnormality [E87.8]     Metabolic acidosis [E87.20]     Elevated troponin [R79.89]     Hypertension [I10]     Hyperlipidemia [E78.5]     Stage 3a chronic kidney disease [N18.31]     Hypothyroid [E03.9]     RBBB [I45.10]        Past Surgical History:  Past Surgical History[2]    Hospital Medications:  Current Medications[3]    Current Outpatient Medications:  Prescriptions Prior to Admission[4]    Medication Allergy:  Allergies[5]    Family History:  Family History   Problem Relation Age of Onset    Heart Disease Neg Hx        Social History:  Social History     Socioeconomic History    Marital status: Single     Spouse name: Not on file    Number of children: Not on file    Years of education: Not on file    Highest education level: Not on file   Occupational History    Not on file   Tobacco Use    Smoking status: Former     Current packs/day: 0.00     Types: Cigarettes     Quit date: 1972     Years since quittin.5    Smokeless tobacco: Never   Substance and Sexual Activity    Alcohol use: No    Drug use: Not Currently    Sexual activity: Not on file   Other Topics Concern    Not on file   Social History Narrative    Not on file     Social Drivers of Health     Financial Resource Strain: Not on file   Food Insecurity: No Food Insecurity (2025)    Hunger Vital Sign     Worried About Running Out of Food in the Last Year: Never true     Ran Out of Food in the Last Year: Never true   Transportation Needs: No Transportation Needs (2025)    PRAPARE - Transportation     Lack of Transportation (Medical): No     Lack of Transportation (Non-Medical): No   Physical Activity: Not on file   Stress: Not on file   Social Connections: Not on file   Intimate Partner Violence: Not At Risk (7/15/2025)    Humiliation, Afraid, Rape, and Kick questionnaire     Fear of  "Current or Ex-Partner: No     Emotionally Abused: No     Physically Abused: No     Sexually Abused: No   Housing Stability: Low Risk  (7/16/2025)    Housing Stability Vital Sign     Unable to Pay for Housing in the Last Year: No     Number of Times Moved in the Last Year: 0     Homeless in the Last Year: No         Physical Exam:  Vitals/ General Appearance:   Weight/BMI: Body mass index is 24.41 kg/m².  /69   Pulse 67   Temp 36.1 °C (97 °F) (Temporal)   Resp 18   Ht 1.74 m (5' 8.5\")   Wt 73.9 kg (162 lb 14.7 oz)   SpO2 95%   Vitals:    07/17/25 0900 07/17/25 1000 07/17/25 1200 07/17/25 1251   BP: 119/66  118/65 134/69   Pulse: 80 67 75 67   Resp: (!) 23 19 (!) 21 18   Temp:  36.3 °C (97.3 °F) 36.4 °C (97.5 °F) 36.1 °C (97 °F)   TempSrc:   Temporal Temporal   SpO2: 94% 94% 95% 95%   Weight:       Height:         Oxygen Therapy:  Pulse Oximetry: 95 %, O2 (LPM): 0, O2 Delivery Device: None - Room Air    Constitutional:  No acute distress  HENMT:  Normocephalic, Atraumatic  Eyes:  EOMI  Neck:  Normal range of motion  Lungs:  Normal respiratory effort  : No caceres catheter  Skin: Warm, Dry  Neurologic: No focal deficits  Psychiatric: Affect normal, Judgment normal, Mood normal.      MDM (Data Review):     Records reviewed and summarized in current documentation    Lab Data Review:  Recent Results (from the past 24 hours)   Basic Metabolic Panel    Collection Time: 07/16/25  8:35 PM   Result Value Ref Range    Sodium 142 135 - 145 mmol/L    Potassium 3.8 3.6 - 5.5 mmol/L    Chloride 113 (H) 96 - 112 mmol/L    Co2 12 (L) 20 - 33 mmol/L    Glucose 121 (H) 65 - 99 mg/dL    Bun 47 (H) 8 - 22 mg/dL    Creatinine 2.32 (H) 0.50 - 1.40 mg/dL    Calcium 7.3 (L) 8.5 - 10.5 mg/dL    Anion Gap 17.0 (H) 7.0 - 16.0   MAGNESIUM    Collection Time: 07/16/25  8:35 PM   Result Value Ref Range    Magnesium 1.7 1.5 - 2.5 mg/dL   PHOSPHORUS    Collection Time: 07/16/25  8:35 PM   Result Value Ref Range    Phosphorus 2.6 2.5 - " 4.5 mg/dL   ESTIMATED GFR    Collection Time: 07/16/25  8:35 PM   Result Value Ref Range    GFR (CKD-EPI) 28 (A) >60 mL/min/1.73 m 2   MAGNESIUM    Collection Time: 07/17/25  9:10 AM   Result Value Ref Range    Magnesium 2.0 1.5 - 2.5 mg/dL   PHOSPHORUS    Collection Time: 07/17/25  9:10 AM   Result Value Ref Range    Phosphorus 2.6 2.5 - 4.5 mg/dL       Imaging/Procedures Review:    Reviewed    MDM (Assessment and Plan):     Active Hospital Problems    Diagnosis     Iron deficiency anemia [D50.9]     Acute renal failure (ARF) (HCC) [N17.9]     Hydronephrosis [N13.30]     Hypocalcemia [E83.51]     Hypomagnesemia [E83.42]     Normocytic anemia [D64.9]     Atrial fibrillation (HCC) [I48.91]     Electrolyte abnormality [E87.8]     Metabolic acidosis [E87.20]     Elevated troponin [R79.89]     Hypertension [I10]     Hyperlipidemia [E78.5]     Stage 3a chronic kidney disease [N18.31]     Hypothyroid [E03.9]     RBBB [I45.10]      80yoM admitted for metabolic acidosis, electrolyte abnormalities, ARF, sepsis - urology consulted when patient found to have left hydronephrosis 2/2 possible UPJ-O vs. L parapelvic cyst on CT imaging in the ER.     Creat 2.32 (2.03, 2.32, 2.44, 3.06), with baseline ~1.26  WBC 14.3, on Zosyn  Urine culture pending  Blood cultures NGTD  AFVSS    I reviewed CT imaging, also with non-obstructing 13mm L renal stone and enlarged prostate  Of note, prior RBUS in 2020 did demonstrate L hydronephrosis as well, suggesting possible chronic finding    Plan:    - Trend renal function  - Monitor clinically  - Trial Flomax 0.4mg QHS  - If creatinine rises, would consider L ureteral stent placement vs. L NPT  - If creatinine improving, would recommend eventual CT-urogram to better assess possible UPJ obstruction vs. cyst  - Urology following    Arianne Zaman PA-C  [facility]             [1]   Past Medical History:  Diagnosis Date    Abnormal EKG     Hyperlipidemia     Hypertension    [2] History reviewed. No  "pertinent surgical history.  [3]   Current Facility-Administered Medications:     ferrous sulfate tablet 325 mg, 325 mg, Oral, QDAY with Breakfast, Nader Marlow M.D., 325 mg at 07/17/25 0830    amiodarone (Cordarone) tablet 200 mg, 200 mg, Oral, Q DAY, Nader Marlow M.D., 200 mg at 07/17/25 0830    LR (Bolus) infusion 500 mL, 500 mL, Intravenous, Once PRN, Navya Perry M.D.    dextrose 5% infusion, , Intravenous, Continuous, Nader Marlow M.D., Stopped at 07/17/25 0827    apixaban (Eliquis) tablet 2.5 mg, 2.5 mg, Oral, BID, Nadre Marlow M.D., 2.5 mg at 07/17/25 0607    sodium bicarbonate tablet 650 mg, 650 mg, Oral, TID, Nader Marlow M.D., 650 mg at 07/17/25 0830    atorvastatin (Lipitor) tablet 10 mg, 10 mg, Oral, Nightly, Navya Perry M.D., 10 mg at 07/16/25 2038    levothyroxine (Synthroid) tablet 112 mcg, 112 mcg, Oral, AM ES, Navya Perry M.D., 112 mcg at 07/17/25 0607    acetaminophen (Tylenol) tablet 650 mg, 650 mg, Oral, Q6HRS PRN, Navya Perry M.D.    senna-docusate (Pericolace Or Senokot S) 8.6-50 MG per tablet 2 Tablet, 2 Tablet, Oral, Q EVENING **AND** polyethylene glycol/lytes (Miralax) Packet 1 Packet, 1 Packet, Oral, QDAY PRN, Navya Perry M.D.  [4]   Medications Prior to Admission   Medication Sig Dispense Refill Last Dose/Taking    vitamin D3 (CHOLECALCIFEROL) 25 MCG (1000 UT) Tab Take 5,000 Units by mouth every day.   7/14/2025 Morning    levothyroxine (SYNTHROID) 112 MCG Tab Take 112 mcg by mouth every morning on an empty stomach.   7/14/2025 Morning    amLODIPine (NORVASC) 5 MG Tab Take 5 mg by mouth every day.   7/14/2025 Morning    losartan (COZAAR) 100 MG Tab Take 100 mg by mouth every day.   7/14/2025 Morning    Multiple Vitamins-Minerals (CENTRUM SILVER PO) Take  by mouth. \"150 multivitamin\"   7/14/2025 Morning    aspirin EC (ECOTRIN) 81 MG Tablet Delayed Response Take 81 mg by mouth every day.  "  7/14/2025 Morning    atorvastatin (LIPITOR) 10 MG Tab Take 10 mg by mouth every evening.   7/14/2025 Morning   [5] No Known Allergies

## 2025-07-17 NOTE — CARE PLAN
Problem: Fall Risk  Goal: Patient will remain free from falls  Description: Target End Date:  Prior to discharge or change in level of care    Document interventions on the [facility] Fall Risk Assessment    1.  Assess for fall risk factors  2.  Implement fall precautions  Outcome: Progressing     Problem: Hemodynamics  Goal: Patient's hemodynamics, fluid balance and neurologic status will be stable or improve  Description: Target End Date:  Prior to discharge or change in level of care    Document on Assessment and I/O flowsheet templates    1.  Monitor vital signs, pulse oximetry and cardiac monitor per provider order and/or policy  2.  Maintain blood pressure per provider order  3.  Hemodynamic monitoring per provider order  4.  Manage IV fluids and IV infusions  5.  Monitor intake and output  6.  Daily weights per unit policy or provider order  7.  Assess peripheral pulses and capillary refill  8.  Assess color and body temperature  9.  Position patient for maximum circulation/cardiac output  10. Monitor for signs/symptoms of excessive bleeding  11. Assess mental status, restlessness and changes in level of consciousness  12. Monitor temperature and report fever or hypothermia to provider immediately. Consideration of targeted temperature management.  Outcome: Progressing     Problem: Physical Regulation  Goal: Diagnostic test results will improve  Description: Target End Date:  Prior to discharge or change in level of care    1.  Monitor lactic acid levels  2.  Monitor ABG's  3.  Monitor diagnostic test results  Outcome: Progressing   The patient is Stable - Low risk of patient condition declining or worsening    Shift Goals  Clinical Goals: stabilize HR. optimize electrolytes  Patient Goals: ambulate, BM  Family Goals: mauro    Progress made toward(s) clinical / shift goals:  HR 60s-115s on amiodarone infusion. Pt required magnesium and calcium gluconate to replete electrolytes. Pt ambulate din room with  staff.     Patient is not progressing towards the following goals: Pt did not have a BM.

## 2025-07-17 NOTE — THERAPY
"Physical Therapy   Initial Evaluation     Patient Name:  Thomas Slater  Age:  80 y.o., Sex:  male  Medical Record #:  [id number]  Today's Date: 7/17/2025     Precautions  Medical: Fall Risk    Assessment  Pt is a 80 y.o. male, with h/o HTN, HLD, CKD3 who was sent to the ER by PCP on 7/15/2025 with abnormal lab results, metabolic acidosis and severe hypocalcemia.  Patient reports that has had intermittent diarrhea since last year and still does not know the cause.  Over the last month or so, also is unable to eat much as he reports that \"I dry heave and vomit everything a few seconds after eating \".  He is feeling so unwell to the point that is afraid of eating.  He denies having significant pain and went to his PCP for evaluation on his condition that is getting worse.  Patient was told to immediately come to the emergency department as he was found to have a calcium level of 6.4, creatinine of 2.6, CO2 of 10 and anion gap of 20.  Patient lost at least 25 pounds over the last 6 months. Pt also developed afib with RVR and now started on amiodarone and anticoagulated as CHAdVASC score of 3. Pt has been having chronic diarrhea and also having problems with nausea.  Upon PT evaluation, pt presents with multiple LOB during gait. Pt refuses use of FWW, despite pt education on recommendation for FWW due to fall risk. Slightly impulsive and impaired safety awareness throughout. Pt was previously I with all ADLs and was driving self, however now not at baseline level of mobility. Due to unsteadiness on feet and refusal to use FWW, pt high fall risk at this time. Pt lives alone and reports he has no family or friends to assist upon discharge. Pt would benefit from acute skilled PT services while at this hospital. Recommend home with home health services or post acute facility pending progress.     Plan    Physical Therapy Initial Treatment Plan   Treatment Plan : Bed Mobility, Family / Caregiver Training, Gait Training, " Neuro Re-Education / Balance, Self Care / Home Evaluation, Stair Training, Therapeutic Activities, Therapeutic Exercise  Treatment Frequency: 4 Times per Week  Duration: Until Therapy Goals Met    DC Equipment Recommendations: Front-Wheel Walker  Discharge Recommendations: Recommend home health for continued physical therapy services (Home health vs pos- acute placement pending progress.)        Objective       07/17/25 1430   Initial Contact Note    Initial Contact Note Order Received and Verified, Physical Therapy Evaluation in Progress with Full Report to Follow.   Precautions   Medical Fall Risk   Vitals   Pulse Oximetry 95 %   O2 Delivery Device None - Room Air   Prior Living Situation   Housing / Facility 1 Story Apartment / Condo  (On second floor without elevator access)   Steps Into Home 12   Steps In Home 0   Rail Left Rail  (Steps into Home)   Elevator No   Equipment Owned Single Point Cane   Lives with - Patient's Self Care Capacity Alone and previously able to Care For Self   Comments Pt previously living alone and was able to care for himself. Gradual decline in mobility over the last month or so.   Prior Level of Functional Mobility   Bed Mobility Independent   Transfer Status Independent   Ambulation Independent   Ambulation Distance Community distances   Assistive Devices Used Single Point Cane   Stairs Independent   History of Falls   History of Falls No   Cognition    Level of Consciousness Alert   Comments Pt slightly impulsive with impaired safety awareness throughout.   Active ROM Lower Body    Active ROM Lower Body  WDL   Strength Lower Body   Lower Body Strength  WDL   Comments   (B LE MMT 4/5)   Balance Assessment   Sitting Balance (Static) Good   Sitting Balance (Dynamic) Good   Standing Balance (Static) Fair -   Standing Balance (Dynamic) Poor +   Weight Shift Sitting Good   Weight Shift Standing Fair   Comments   (High fall risk)   Bed Mobility    Comments DNT - pt sitting in chair at  beginning and end of session.   Gait Analysis   Gait Level Of Assist Contact Guard Assist   Assistive Device None  (Pt refused use of any AD.)   Distance (Feet) 250   # of Times Distance was Traveled 1   Deviation   (Unsteady on feet, multiple LOB requiring therapist assist to prevent fall)   # of Stairs Climbed 6   Level of Assist with Stairs Contact Guard Assist   Functional Mobility   Sit to Stand Contact Guard Assist   Bed, Chair, Wheelchair Transfer Contact Guard Assist   Transfer Method Stand Step   Mobility Sit to stand, stand step, gait, stairs   6 Clicks Assessment - How much HELP from from another person do you currently need... (If the patient hasn't done an activity recently, how much help from another person do you think he/she would need if he/she tried?)   Turning from your back to your side while in a flat bed without using bedrails? 4   Moving from lying on your back to sitting on the side of a flat bed without using bedrails? 4   Moving to and from a bed to a chair (including a wheelchair)? 4   Standing up from a chair using your arms (e.g., wheelchair, or bedside chair)? 4   Walking in hospital room? 3   Climbing 3-5 steps with a railing? 3   6 clicks Mobility Score 22   Short Term Goals    Short Term Goal # 1 Pt will be able to walk 250 ft with no LOB with MI.   Short Term Goal # 2 Pt will be able to ascend/ descend 12 stairs with L handrail with MI with no LOB.   Short Term Goal # 3 Pt will be MI for all transfers.   Education Group   Education Provided Role of Physical Therapist;Gait Training;Stair Training;Exercises - Supine;Exercises - Seated   Role of Physical Therapist Patient Response Patient;Acceptance;Explanation;Verbal Demonstration   Gait Training Patient Response Patient;Acceptance;Explanation;Demonstration;Verbal Demonstration;Action Demonstration   Stair Training Patient Response Patient;Acceptance;Explanation;Demonstration;Verbal Demonstration;Action Demonstration   Exercises -  Supine Patient Response Patient;Acceptance;Explanation;Demonstration;Verbal Demonstration;Action Demonstration   Exercises - Seated Patient Response Patient;Acceptance;Explanation;Demonstration;Verbal Demonstration;Action Demonstration   Physical Therapy Initial Treatment Plan    Treatment Plan  Bed Mobility;Family / Caregiver Training;Gait Training;Neuro Re-Education / Balance;Self Care / Home Evaluation;Stair Training;Therapeutic Activities;Therapeutic Exercise   Treatment Frequency 4 Times per Week   Duration Until Therapy Goals Met   Problem List    Problems Impaired Transfers;Impaired Ambulation;Functional Strength Deficit;Impaired Balance;Safety Awareness Deficits / Cognition;Decreased Activity Tolerance   Anticipated Discharge Equipment and Recommendations   DC Equipment Recommendations Front-Wheel Walker   Discharge Recommendations Recommend home health for continued physical therapy services  (Home health vs pos- acute placement pending progress.)   Interdisciplinary Plan of Care Collaboration   IDT Collaboration with  Nursing   Patient Position at End of Therapy Seated   Collaboration Comments 7/17: PT jagjit completed. 4x/week   Session Information   Date / Session Number  7/17, 1 (1/4, 7/23)

## 2025-07-17 NOTE — DISCHARGE PLANNING
Care Transition Team Assessment    LMSW met with pt at bedside. Pt was pleasant and willing to participate in assessment.  LMSW Introduced self and department roles. Pt A&Ox4 and able to verify the information on the face sheet. Pt lives alone and does not have any living NOK. Pt reports he has a Living Will and his PCP Grzegorz Santizo MD is the POA. Pt states his ACP documents can be accessed through Drive[employer] copy of care was obtained and scanned into Media.     Prior to this hospitalization Pt was independent at home with ADLs and all IADLs including driving. Pt does not use any DME at baseline. Pt states his support system is very limited as most of his close friends and family have passes away. Pt denies HX of SNF/Rehab admissions and is not connected with any [facility] agency. Pt is retired and receives SSI monthly deposits, amount was not disclosed. Pt denies any financial, SA or MH concerns.     Information Source  Orientation Level: Oriented X4  Information Given By: Patient  Who is responsible for making decisions for patient? : Patient    Readmission Evaluation  Is this a readmission?: No    Elopement Risk  Legal Hold: No  Ambulatory or Self Mobile in Wheelchair: No-Not an Elopement Risk  Elopement Risk: Not at Risk for Elopement    Interdisciplinary Discharge Planning  Lives with - Patient's Self Care Capacity: Alone and Able to Care For Self  Patient or legal guardian wants to designate a caregiver: No  Support Systems: Friends / Neighbors  Housing / Facility: 2 Story Apartment / Condo    Discharge Preparedness  What is your plan after discharge?: Uncertain - pending medical team collaboration  Prior Functional Level: Ambulatory  Difficulity with ADLs: None  Difficulity with IADLs: None    Functional Assesment  Prior Functional Level: Ambulatory    Finances  Financial Barriers to Discharge: No  Prescription Coverage: Yes    Vision / Hearing Impairment  Vision Impairment : Yes  Right Eye Vision: Wears  Glasses  Left Eye Vision: Wears Glasses  Hearing Impairment : Yes  Hearing Impairment: Both Ears, Patient Declines to Wear Hearing Device(s)  Does Pt Need Special Equipment for the Hearing Impaired?: No    Advance Directive  Advance Directive?: Living Will (pt states his Living Will infomation is on card with [employer] copy in Media)    Domestic Abuse  Have you ever been the victim of abuse or violence?: No  Possible Abuse/Neglect Reported to:: Not Applicable    Psychological Assessment  History of Substance Abuse: None  History of Psychiatric Problems: No    Discharge Risks or Barriers  Discharge risks or barriers?: Lives alone, no community support  Patient risk factors: Lack of outside supports, Lives alone and no community support, Vulnerable adult    Anticipated Discharge Information  Discharge Disposition: Discharged to home/self care (01)  Discharge Address: [address] [place] [address] [address]   [id number]   [place] [id number]

## 2025-07-17 NOTE — PROGRESS NOTES
12-hour chart check complete.    Monitor Summary  Rhythm: Afib  Rate: 102-140  Ectopy: PVC  Measurements: -/.13/-

## 2025-07-17 NOTE — PROGRESS NOTES
12-hour chart check complete.    Monitor Summary  Rhythm: SR-afib with BBB  Rate: 65-99  Ectopy:   Measurements: .16/.13/.42

## 2025-07-18 PROBLEM — I45.10 RIGHT BUNDLE BRANCH BLOCK: Status: RESOLVED | Noted: 2018-12-31 | Resolved: 2025-07-18

## 2025-07-18 ASSESSMENT — PAIN DESCRIPTION - PAIN TYPE
TYPE: ACUTE PAIN

## 2025-07-18 ASSESSMENT — ENCOUNTER SYMPTOMS
CARDIOVASCULAR NEGATIVE: 1
GASTROINTESTINAL NEGATIVE: 1
MUSCULOSKELETAL NEGATIVE: 1
NEUROLOGICAL NEGATIVE: 1
RESPIRATORY NEGATIVE: 1
EYES NEGATIVE: 1
PSYCHIATRIC NEGATIVE: 1

## 2025-07-18 ASSESSMENT — FIBROSIS 4 INDEX: FIB4 SCORE: 3.64

## 2025-07-18 NOTE — PROGRESS NOTES
Telemetry shift summary    Rhythm: SR/SA/ST with BBB  HR:   Ectopy: R-PVC    Measurements: .17 / .13 / .45    Normal values  Rhythm SR  HR   Measurements: 0.12-0.20/0.08-0.10/0.30-0.52

## 2025-07-18 NOTE — CARE PLAN
The patient is Watcher - Medium risk of patient condition declining or worsening    Shift Goals  Clinical Goals: Monitor Vitals, Monitor Cardiac Rhythm, Safety  Patient Goals: Go home  Family Goals: ALLIE    Progress made toward(s) clinical / shift goals:    Problem: Knowledge Deficit - Standard  Goal: Patient and family/care givers will demonstrate understanding of plan of care, disease process/condition, diagnostic tests and medications  Description: Target End Date:  1-3 days or as soon as patient condition allows    Document in Patient Education    1.  Patient and family/caregiver oriented to unit, equipment, visitation policy and means for communicating concern  2.  Complete/review Learning Assessment  3.  Assess knowledge level of disease process/condition, treatment plan, diagnostic tests and medications  4.  Explain disease process/condition, treatment plan, diagnostic tests and medications  Outcome: Progressing     Problem: Fall Risk  Goal: Patient will remain free from falls  Description: Target End Date:  Prior to discharge or change in level of care    Document interventions on the [facility] G Herrera Fall Risk Assessment    1.  Assess for fall risk factors  2.  Implement fall precautions  Outcome: Progressing       Patient is not progressing towards the following goals:

## 2025-07-18 NOTE — PROGRESS NOTES
Telemetry Shift Summary     Rhythm: SR  HR: 77-83  Ectopy: rPVC, rPAC    Measurements: 0.154/0.131/0.407    Normal Values  Rhythm: SR  HR:   Measurements: 0.12-0.20/0.08-0.10/0.30-0.52

## 2025-07-18 NOTE — PROGRESS NOTES
UROLOGY Progress Note    History of Present Illness:    Per my chart review, patient is an 80yoM with PMH including HTN, HLD, and CKD stage 3 who was sent to the ER by PCP on 7/15/2025 with abnormal lab results - metabolic acidosis and severe hypocalcemia. Urology was consulted when CT incidentally demonstrated left hydronephrosis in the setting of ARF, in the absence of ureteral stone.     Interval Updates:    7/18. Pt is lying comfortably in bed in NAD. Family at bedside. He is afebrile, normotensive. He continues on flomax. His creatinine has increased and is now 3.43 (2.32), GFR 17 (28). Patient's family member has noted that he has not been performing CIC. He usually performs this 4x daily.     7/17. Patient denies any new pain, denies L flank pain. Reports increased difficulty emptying the bladder while here in hospital, though feels he is able to empty adequately with no bladder pain/pressure. Discussed trial of Flomax. Reviewed labs, chart notes. Discussed case with Dr. Mora.    7/16. On exam, patient is sitting up comfortably in bed. He denies any flank pain. Reports occasional R-sided back pain with physical activity relieved by massage. He states he did have a possible kidney injury 2/2 trauma in the [employer] several decades ago. Denies any voiding difficulty or UTI symptoms.     Review of Systems:      Constitutional: Denies fevers  Eyes: Denies changes in vision, no eye pain  Ears/Nose/Throat/Mouth: Denies nasal congestion or sore throat   Cardiovascular: no chest pain, no palpitations   Respiratory: no shortness of breath , Denies cough  Gastrointestinal/Hepatic: see HPI  Genitourinary: See HPI  Musculoskeletal/Rheum: Denies joint pain and swelling, no edema  Skin: Denies rash  Neurological: Denies headache, confusion, memory loss or focal weakness/parasthesias  Psychiatric: denies mood disorder   Endocrine: Elizabeth thyroid problems  Heme/Oncology/Lymph Nodes: Denies enlarged lymph nodes, denies brusing or  known bleeding disorder  All other systems were reviewed and are negative (AMA/CMS criteria)                Past Medical History:   Past Medical History[1]  Active Hospital Problems    Diagnosis     Iron deficiency anemia [D50.9]     Acute renal failure (ARF) (HCC) [N17.9]     Hydronephrosis [N13.30]     Hypocalcemia [E83.51]     Hypomagnesemia [E83.42]     Normocytic anemia [D64.9]     Atrial fibrillation (HCC) [I48.91]     Electrolyte abnormality [E87.8]     Metabolic acidosis [E87.20]     Elevated troponin [R79.89]     Hypertension [I10]     Hyperlipidemia [E78.5]     Stage 3a chronic kidney disease [N18.31]     Hypothyroid [E03.9]        Past Surgical History:  Past Surgical History[2]    Hospital Medications:  Current Medications[3]    Current Outpatient Medications:  Prescriptions Prior to Admission[4]    Medication Allergy:  Allergies[5]    Family History:  Family History   Problem Relation Age of Onset    Heart Disease Neg Hx        Social History:  Social History     Socioeconomic History    Marital status: Single     Spouse name: Not on file    Number of children: Not on file    Years of education: Not on file    Highest education level: Not on file   Occupational History    Not on file   Tobacco Use    Smoking status: Former     Current packs/day: 0.00     Types: Cigarettes     Quit date: 1972     Years since quittin.5    Smokeless tobacco: Never   Substance and Sexual Activity    Alcohol use: No    Drug use: Not Currently    Sexual activity: Not on file   Other Topics Concern    Not on file   Social History Narrative    Not on file     Social Drivers of Health     Financial Resource Strain: Not on file   Food Insecurity: No Food Insecurity (2025)    Hunger Vital Sign     Worried About Running Out of Food in the Last Year: Never true     Ran Out of Food in the Last Year: Never true   Transportation Needs: No Transportation Needs (2025)    PRAPARE - Transportation     Lack of  "Transportation (Medical): No     Lack of Transportation (Non-Medical): No   Physical Activity: Not on file   Stress: Not on file   Social Connections: Not on file   Intimate Partner Violence: Not At Risk (7/15/2025)    Humiliation, Afraid, Rape, and Kick questionnaire     Fear of Current or Ex-Partner: No     Emotionally Abused: No     Physically Abused: No     Sexually Abused: No   Housing Stability: Low Risk  (7/16/2025)    Housing Stability Vital Sign     Unable to Pay for Housing in the Last Year: No     Number of Times Moved in the Last Year: 0     Homeless in the Last Year: No         Physical Exam:  Vitals/ General Appearance:   Weight/BMI: Body mass index is 26.66 kg/m².  /77   Pulse 94   Temp 36.2 °C (97.1 °F) (Temporal)   Resp 18   Ht 1.74 m (5' 8.5\")   Wt 80.7 kg (177 lb 14.6 oz)   SpO2 98%   Vitals:    07/18/25 0617 07/18/25 0742 07/18/25 0821 07/18/25 1127   BP: 116/59 120/59 112/52 117/77   Pulse: 96 100 (!) 109 94   Resp: 18 18 17 18   Temp:  36.3 °C (97.3 °F) 36.2 °C (97.1 °F) 36.2 °C (97.1 °F)   TempSrc:  Temporal Temporal Temporal   SpO2: 98% 98% 95% 98%   Weight:       Height:         Oxygen Therapy:  Pulse Oximetry: 98 %, O2 (LPM): 0, O2 Delivery Device: None - Room Air    Constitutional:  No acute distress  HENMT:  Normocephalic, Atraumatic  Eyes:  EOMI  Neck:  Normal range of motion  Lungs:  Normal respiratory effort  : No caceres catheter  Skin: Warm, Dry  Neurologic: No focal deficits  Psychiatric: Affect normal, Judgment normal, Mood normal.      MDM (Data Review):     Records reviewed and summarized in current documentation    Lab Data Review:  Recent Results (from the past 24 hours)   MAGNESIUM    Collection Time: 07/17/25  6:31 PM   Result Value Ref Range    Magnesium 1.9 1.5 - 2.5 mg/dL   PHOSPHORUS    Collection Time: 07/17/25  6:31 PM   Result Value Ref Range    Phosphorus 3.0 2.5 - 4.5 mg/dL   Comp Metabolic Panel    Collection Time: 07/18/25  1:43 AM   Result Value Ref " Range    Sodium 139 135 - 145 mmol/L    Potassium 3.6 3.6 - 5.5 mmol/L    Chloride 111 96 - 112 mmol/L    Co2 11 (L) 20 - 33 mmol/L    Anion Gap 17.0 (H) 7.0 - 16.0    Glucose 94 65 - 99 mg/dL    Bun 54 (H) 8 - 22 mg/dL    Creatinine 3.43 (H) 0.50 - 1.40 mg/dL    Calcium 8.1 (L) 8.5 - 10.5 mg/dL    Correct Calcium 9.1 8.5 - 10.5 mg/dL    AST(SGOT) 45 12 - 45 U/L    ALT(SGPT) 44 2 - 50 U/L    Alkaline Phosphatase 86 30 - 99 U/L    Total Bilirubin 0.3 0.1 - 1.5 mg/dL    Albumin 2.8 (L) 3.2 - 4.9 g/dL    Total Protein 5.8 (L) 6.0 - 8.2 g/dL    Globulin 3.0 1.9 - 3.5 g/dL    A-G Ratio 0.9 g/dL   ESTIMATED GFR    Collection Time: 07/18/25  1:43 AM   Result Value Ref Range    GFR (CKD-EPI) 17 (A) >60 mL/min/1.73 m 2   MAGNESIUM    Collection Time: 07/18/25  1:43 AM   Result Value Ref Range    Magnesium 1.9 1.5 - 2.5 mg/dL   PHOSPHORUS    Collection Time: 07/18/25  1:43 AM   Result Value Ref Range    Phosphorus 3.3 2.5 - 4.5 mg/dL       Imaging/Procedures Review:    Reviewed    MDM (Assessment and Plan):     Active Hospital Problems    Diagnosis     Iron deficiency anemia [D50.9]     Acute renal failure (ARF) (HCC) [N17.9]     Hydronephrosis [N13.30]     Hypocalcemia [E83.51]     Hypomagnesemia [E83.42]     Normocytic anemia [D64.9]     Atrial fibrillation (HCC) [I48.91]     Electrolyte abnormality [E87.8]     Metabolic acidosis [E87.20]     Elevated troponin [R79.89]     Hypertension [I10]     Hyperlipidemia [E78.5]     Stage 3a chronic kidney disease [N18.31]     Hypothyroid [E03.9]      80yoM admitted for metabolic acidosis, electrolyte abnormalities, ARF, sepsis - urology consulted when patient found to have left hydronephrosis 2/2 possible UPJ-O vs. L parapelvic cyst on CT imaging in the ER.     Creat (2.32,2.03, 2.32, 2.44, 3.06), with baseline ~1.26    Urine culture pending  Blood cultures NGTD      Plan:  -Please perform PVR q 4 hrs, would recommend caceres catheter placement if patient is unable to perform CIC  4x/day.   - Trend renal function  - Monitor clinically  - Trial Flomax 0.4mg QHS  - If creatinine rises, would consider L ureteral stent placement vs. L NPT  - If creatinine improving, would recommend eventual CT-urogram to better assess possible UPJ obstruction vs. cyst  - Urology following     Amara Arias P.A.-C.   [address] [place] [address]   [phone]              [1]   Past Medical History:  Diagnosis Date    Abnormal EKG     Hyperlipidemia     Hypertension    [2] History reviewed. No pertinent surgical history.  [3]   Current Facility-Administered Medications:     ferrous sulfate tablet 325 mg, 325 mg, Oral, QDAY with Breakfast, Nader Marlow M.D., 325 mg at 07/18/25 0836    amiodarone (Cordarone) tablet 200 mg, 200 mg, Oral, Q DAY, Nader Marlow M.D., 200 mg at 07/18/25 0618    tamsulosin (Flomax) capsule 0.4 mg, 0.4 mg, Oral, AFTER DINNER, Arianne Zaman P.A.-C., 0.4 mg at 07/17/25 1829    LR (Bolus) infusion 500 mL, 500 mL, Intravenous, Once PRN, Navya Perry M.D.    dextrose 5% infusion, , Intravenous, Continuous, Nader Marlow M.D., Stopped at 07/17/25 0827    apixaban (Eliquis) tablet 2.5 mg, 2.5 mg, Oral, BID, Nader Marlow M.D., 2.5 mg at 07/18/25 0618    sodium bicarbonate tablet 650 mg, 650 mg, Oral, TID, Nader Marlow M.D., 650 mg at 07/18/25 0836    atorvastatin (Lipitor) tablet 10 mg, 10 mg, Oral, Nightly, Navya ePrry M.D., 10 mg at 07/17/25 2135    levothyroxine (Synthroid) tablet 112 mcg, 112 mcg, Oral, AM ES, Navya Perry M.D., 112 mcg at 07/18/25 0618    acetaminophen (Tylenol) tablet 650 mg, 650 mg, Oral, Q6HRS PRN, Navya Perry M.D.    senna-docusate (Pericolace Or Senokot S) 8.6-50 MG per tablet 2 Tablet, 2 Tablet, Oral, Q EVENING **AND** polyethylene glycol/lytes (Miralax) Packet 1 Packet, 1 Packet, Oral, QDAY PRN, Navya Perry M.D.  [4]   Medications Prior to Admission   Medication  "Sig Dispense Refill Last Dose/Taking    vitamin D3 (CHOLECALCIFEROL) 25 MCG (1000 UT) Tab Take 5,000 Units by mouth every day.   7/14/2025 Morning    levothyroxine (SYNTHROID) 112 MCG Tab Take 112 mcg by mouth every morning on an empty stomach.   7/14/2025 Morning    amLODIPine (NORVASC) 5 MG Tab Take 5 mg by mouth every day.   7/14/2025 Morning    losartan (COZAAR) 100 MG Tab Take 100 mg by mouth every day.   7/14/2025 Morning    Multiple Vitamins-Minerals (CENTRUM SILVER PO) Take  by mouth. \"150 multivitamin\"   7/14/2025 Morning    aspirin EC (ECOTRIN) 81 MG Tablet Delayed Response Take 81 mg by mouth every day.   7/14/2025 Morning    atorvastatin (LIPITOR) 10 MG Tab Take 10 mg by mouth every evening.   7/14/2025 Morning   [5] No Known Allergies    "

## 2025-07-18 NOTE — PROGRESS NOTES
[facility] Medicine Daily Progress Note    Date of Service  7/18/2025    Chief Complaint  Thomas Slater is a 80 y.o. male sent to the ER on 7/15/2025 by his PCP due to abnormal labs (including low magnesium, low calcium, metabolic acidosis).  Has a history of chronic diarrhea.    Hospital Course  Patient was admitted to the ICU, on admission, WBCs of 18.3, potassium was 3.4, anion gap was 20, CO2 was 10, creatinine was 2.6. Corrected calcium was 6.6, AST was 52, ALT 2 was 32. Iron was 12. Electrolytes were replaced. Iron supplements and sodium bicarb were started.    Patient developed A-fib with RVR. Amiodarone and Eliquis were started. Troponin was 58 -> 49 -> 48.  EKG showed RBBB and LAFB, no significant change from previous EKG a few years ago.  Creatinine was increasing.  CT abdomen and pelvis showed left renal stone, left-sided hydronephrosis secondary to a UPJ stenosis versus parapelvic cyst formation.    He was evaluated by urology.  Per urology, if creatinine rises, would consider left ureteral stent placement versus left NPT.    Interval Problem Update  Anion gap is 17, corrected calcium is normal (9.1), creatinine increased to 3.43.  CO2 is 11, anion gap is 17.  Blood cultures have been negative.    Evaluated by urology today. Patient reportedly is supposed to be doing CIC 4 times a day at home. Per recommendations, perform PVR every 4 hours and recommend Johns catheter if patient is unable to perform CIC 4 times a day. Flomax was started    I have discussed this patient's plan of care and discharge plan at IDT rounds today with Case Management, Nursing, Nursing leadership, and other members of the IDT team.    Consultants/Specialty  critical care and urology    Code Status  Full Code    Disposition  The patient is not medically cleared for discharge to home or a post-acute facility.      I have placed the appropriate orders for post-discharge needs.    Review of Systems  Review of Systems    Constitutional:  Positive for malaise/fatigue.   HENT: Negative.     Eyes: Negative.    Respiratory: Negative.     Cardiovascular: Negative.    Gastrointestinal: Negative.    Genitourinary: Negative.    Musculoskeletal: Negative.    Skin: Negative.    Neurological: Negative.    Endo/Heme/Allergies: Negative.    Psychiatric/Behavioral: Negative.          Physical Exam  Temp:  [35.9 °C (96.7 °F)-36.3 °C (97.3 °F)] 36.2 °C (97.1 °F)  Pulse:  [] 94  Resp:  [17-19] 18  BP: (112-136)/(52-80) 117/77  SpO2:  [95 %-98 %] 98 %    Physical Exam  HENT:      Head: Normocephalic.      Nose: Nose normal.      Mouth/Throat:      Mouth: Mucous membranes are moist.   Eyes:      Pupils: Pupils are equal, round, and reactive to light.   Cardiovascular:      Rate and Rhythm: Normal rate.   Pulmonary:      Effort: Pulmonary effort is normal.   Abdominal:      Palpations: Abdomen is soft.   Musculoskeletal:      Cervical back: Normal range of motion.      Right lower leg: No edema.      Left lower leg: No edema.   Skin:     General: Skin is warm.   Neurological:      General: No focal deficit present.      Mental Status: He is alert.   Psychiatric:         Mood and Affect: Mood normal.         Fluids    Intake/Output Summary (Last 24 hours) at 7/18/2025 1557  Last data filed at 7/18/2025 1127  Gross per 24 hour   Intake 360 ml   Output 200 ml   Net 160 ml        Laboratory  Recent Labs     07/15/25  1810 07/16/25  0200   WBC 18.2* 14.3*   RBC 4.03* 3.36*   HEMOGLOBIN 12.8* 10.6*   HEMATOCRIT 37.8* 31.2*   MCV 93.8 92.9   MCH 31.8 31.5   MCHC 33.9 34.0   RDW 44.9 43.1   PLATELETCT 188 149*   MPV 12.0 11.9     Recent Labs     07/16/25  0830 07/16/25  2035 07/18/25  0143   SODIUM 140 142 139   POTASSIUM 3.7 3.8 3.6   CHLORIDE 114* 113* 111   CO2 11* 12* 11*   GLUCOSE 128* 121* 94   BUN 45* 47* 54*   CREATININE 2.03* 2.32* 3.43*   CALCIUM 7.1* 7.3* 8.1*     Recent Labs     07/16/25  0355   INR 2.22*                Imaging  CT-ABDOMEN-PELVIS W/O   Final Result      1.  Atherosclerosis.   2.  Left renal stone.   3.  Left-sided hydronephrosis secondary to a UPJ stenosis versus parapelvic cyst formation.   4.  Enlarged prostate.   5.  No evidence for bowel obstruction, diverticulitis, or appendicitis.         DX-CHEST-PORTABLE (1 VIEW)   Final Result      Hypoinflation without other evidence for acute cardiopulmonary disease.           Assessment/Plan  * Metabolic acidosis- (present on admission)  Assessment & Plan  On admission, CO2 was 9, anion gap was 22.  No significant improvement.,  Anion gap is 17 today, CO2 is 11.  On sodium bicarb.  No positive cultures.    Atrial fibrillation (HCC)- (present on admission)  Assessment & Plan  Patient developed A-fib with RVR.  Blood pressure was soft.  Home Norvasc and losartan were held.  Amiodarone and Eliquis was started.    Hydronephrosis- (present on admission)  Assessment & Plan  CT abdomen and pelvis showed left renal stone, left-sided hydronephrosis secondary to a UPJ stenosis versus parapelvic cyst formation.     Urology (Dr. Whitaker) was consulted from the ER. Evaluated by urology today. Patient reportedly is supposed to be doing CIC 4 times a day at home. Per Urology, perform PVR every 4 hours, place Johns catheter if patient is unable to perform CIC 4 times a day. Flomax was started, if creatinine continues to rise, Urology will consider left ureteral stent placement versus left NPT.    Acute renal failure (ARF) (HCC)- (present on admission)  Assessment & Plan  Creatinine was 2.6 on admission, has been slowly increasing, 3.43 today.  CT abdomen with pelvis showed  left renal stone, left-sided hydronephrosis secondary to a UPJ stenosis versus parapelvic cyst formation.  UA showed no evidence of bacteria.    Elevated troponin- (present on admission)  Assessment & Plan  Troponin was 58 -> 49 -> 48.  EKG showed RBBB and LAFB, no significant change from previous EKG a few  years ago. Denies chest pain.  Due to demand ischemia from A-fib with RVR, and YADI    Normocytic anemia- (present on admission)  Assessment & Plan  Hemoglobin was 12.8 on admission.  Iron was low, oral iron supplements were started.    Hypomagnesemia- (present on admission)  Assessment & Plan  Resolved.  Monitor    Hypocalcemia- (present on admission)  Assessment & Plan  Resolved.  Monitor    Stage 3a chronic kidney disease- (present on admission)  Assessment & Plan  Creatinine has been increasing, 2.6 on admission, 3.43 today. CT abdomen and pelvis showed left renal stone, left-sided hydronephrosis secondary to a UPJ stenosis versus parapelvic cyst formation.    Hypothyroid- (present on admission)  Assessment & Plan  Continue home levothyroxine    Hyperlipidemia- (present on admission)  Assessment & Plan  Continue home atorvastatin    Hypertension- (present on admission)  Assessment & Plan  Patient has been soft.  Continue to hold home losartan and amlodipine.  Monitor closely         VTE prophylaxis: Eliquis

## 2025-07-18 NOTE — PROGRESS NOTES
"Bedside report received from kristal RN, Aileen ARANA Patient awake and alert in chair AOx4. Pt is on room air. Pt has no complaints of pain at this time. Bed is locked and in low position with bed alarm and strip alarm on the chair. Pt has been educated multiple times to call appropriately but will not do it. Pt stated, \"You don't have anything to do, so if I fall it gives you something to do.\" Pt education of fall prevention and safety. Reviewed plan of care with patient. Call light within reach. No other needs at this time.     Pt refused skin check d/t, \"I already had one. I'm not getting anymore.\" Will continue to monitor.  "

## 2025-07-18 NOTE — PROGRESS NOTES
Assumed care of patient at bedside report from NOC RN. Updated on POC. Patient currently A & O x 4 ; on room air  O2 98 percent saturation; up stand by assist with front wheel walker; without complaints of acute pain. Assessment completed. Call light within reach. Whiteboard updated. Fall precautions in place. Bed locked and in lowest position. All questions answered. No other needs indicated at this time.

## 2025-07-18 NOTE — ASSESSMENT & PLAN NOTE
Patient developed A-fib with RVR.  Blood pressure was soft.  Home Norvasc and losartan were held.  Amiodarone and Eliquis was started.

## 2025-07-18 NOTE — CARE PLAN
The patient is Stable - Low risk of patient condition declining or worsening    Shift Goals  Clinical Goals: Monitor VS, Safety, Ambulate  Patient Goals: Sleep through the night  Family Goals: ALLIE    Progress made toward(s) clinical / shift goals: Pt is AO x4. Pt has no complaints of pain during this shift. Pt is on room air. Pt was educated on the importance of using his call light if he needs to ambulate to the bathroom. VSS. Pt has call light within reach, bed in lowest position, and water pitcher refilled.      Problem: Fall Risk  Goal: Patient will remain free from falls  Outcome: Progressing     Problem: Physical Regulation  Goal: Diagnostic test results will improve  Outcome: Progressing       Patient is not progressing towards the following goals:

## 2025-07-19 ASSESSMENT — PAIN DESCRIPTION - PAIN TYPE
TYPE: ACUTE PAIN
TYPE: ACUTE PAIN

## 2025-07-19 ASSESSMENT — ACTIVITIES OF DAILY LIVING (ADL): TOILETING: INDEPENDENT

## 2025-07-19 ASSESSMENT — COGNITIVE AND FUNCTIONAL STATUS - GENERAL
SUGGESTED CMS G CODE MODIFIER DAILY ACTIVITY: CI
DRESSING REGULAR UPPER BODY CLOTHING: A LITTLE
DAILY ACTIVITIY SCORE: 23

## 2025-07-19 ASSESSMENT — ENCOUNTER SYMPTOMS
NEUROLOGICAL NEGATIVE: 1
MUSCULOSKELETAL NEGATIVE: 1
EYES NEGATIVE: 1
CARDIOVASCULAR NEGATIVE: 1
PSYCHIATRIC NEGATIVE: 1
GASTROINTESTINAL NEGATIVE: 1
RESPIRATORY NEGATIVE: 1

## 2025-07-19 NOTE — CARE PLAN
The patient is Stable - Low risk of patient condition declining or worsening    Shift Goals  Clinical Goals: Monitor HR, Safety, and Ambulation  Patient Goals: Go home  Family Goals: ALLIE    Progress made toward(s) clinical / shift goals: Pt is AO x4. Pt had no complaints of pain. Pt is on room air. Pt ambulated to the bathroom to void. Pt had BM. Pt was educated on safety and called appropriately.       Problem: Fall Risk  Goal: Patient will remain free from falls  Outcome: Progressing     Problem: Knowledge Deficit - Standard  Goal: Patient and family/care givers will demonstrate understanding of plan of care, disease process/condition, diagnostic tests and medications  Outcome: Progressing       Patient is not progressing towards the following goals:

## 2025-07-19 NOTE — PROGRESS NOTES
Hospital Medicine Daily Progress Note    Date of Service  7/19/2025    Chief Complaint  Thomas Slater is a 80 y.o. male sent to the ER on 7/15/2025 by his PCP due to abnormal labs (including low magnesium, low calcium, metabolic acidosis).      Hospital Course  Patient was admitted to the ICU, on admission, WBCs of 18.3, potassium was 3.4, anion gap was 20, CO2 was 10, creatinine was 2.6. Corrected calcium was 6.6, AST was 52, ALT 2 was 32. Iron was 12. Electrolytes were replaced. Iron supplements and sodium bicarb were started.    Patient developed A-fib with RVR. Amiodarone and Eliquis were started. Troponin was 58 -> 49 -> 48.  EKG showed RBBB and LAFB, no significant change from previous EKG a few years ago.  Creatinine was increasing.  CT abdomen and pelvis showed left renal stone, left-sided hydronephrosis secondary to a UPJ stenosis versus parapelvic cyst formation.    He was evaluated by urology.  Per urology, if creatinine rises, would consider left ureteral stent placement versus left NPT.    Per Urology evaluation on 7/18/2025, patient reportedly is supposed to be doing CIC 4 times a day at home, he is recommended to perform PVR every 4 hours and have a Johns catheter paced if he is unable to perform CIC 4 times a day. Flomax was started    Interval Problem Update  Anion gap is 16, creatinine increased from 3.43 -> 4.06.  CO2 is 13.  Blood cultures have been negative. Discussed with Urology for recommendations. Renal US ordered. WBCs are 10.3 K.     I have discussed this patient's plan of care and discharge plan at IDT rounds today with Case Management, Nursing, Nursing leadership, and other members of the IDT team.    Consultants/Specialty  critical care and urology    Code Status  Full Code    Disposition  The patient is not medically cleared for discharge to home or a post-acute facility.      I have placed the appropriate orders for post-discharge needs.    Review of Systems  Review of  Systems   Constitutional:  Positive for malaise/fatigue.   HENT: Negative.     Eyes: Negative.    Respiratory: Negative.     Cardiovascular: Negative.    Gastrointestinal: Negative.    Genitourinary: Negative.    Musculoskeletal: Negative.    Skin: Negative.    Neurological: Negative.    Endo/Heme/Allergies: Negative.    Psychiatric/Behavioral: Negative.          Physical Exam  Temp:  [36.1 °C (96.9 °F)-36.9 °C (98.5 °F)] 36.3 °C (97.4 °F)  Pulse:  [77-94] 77  Resp:  [16-19] 18  BP: (109-152)/(63-86) 152/67  SpO2:  [89 %-97 %] 97 %    Physical Exam  HENT:      Head: Normocephalic.      Nose: Nose normal.      Mouth/Throat:      Mouth: Mucous membranes are moist.   Eyes:      Pupils: Pupils are equal, round, and reactive to light.   Cardiovascular:      Rate and Rhythm: Normal rate.   Pulmonary:      Effort: Pulmonary effort is normal.   Abdominal:      Palpations: Abdomen is soft.   Musculoskeletal:      Cervical back: Normal range of motion.      Right lower leg: No edema.      Left lower leg: No edema.   Skin:     General: Skin is warm.   Neurological:      General: No focal deficit present.      Mental Status: He is alert.   Psychiatric:         Mood and Affect: Mood normal.         Fluids    Intake/Output Summary (Last 24 hours) at 7/19/2025 1257  Last data filed at 7/19/2025 0830  Gross per 24 hour   Intake 240 ml   Output 600 ml   Net -360 ml        Laboratory  Recent Labs     07/19/25  0030   WBC 10.3   RBC 3.48*   HEMOGLOBIN 10.8*   HEMATOCRIT 32.0*   MCV 92.0   MCH 31.0   MCHC 33.8   RDW 43.7   PLATELETCT 216   MPV 11.4     Recent Labs     07/16/25  2035 07/18/25  0143 07/19/25  0030   SODIUM 142 139 139   POTASSIUM 3.8 3.6 4.0   CHLORIDE 113* 111 110   CO2 12* 11* 13*   GLUCOSE 121* 94 98   BUN 47* 54* 58*   CREATININE 2.32* 3.43* 4.06*   CALCIUM 7.3* 8.1* 8.4*                     Imaging  CT-ABDOMEN-PELVIS W/O   Final Result      1.  Atherosclerosis.   2.  Left renal stone.   3.  Left-sided hydronephrosis  secondary to a UPJ stenosis versus parapelvic cyst formation.   4.  Enlarged prostate.   5.  No evidence for bowel obstruction, diverticulitis, or appendicitis.         DX-CHEST-PORTABLE (1 VIEW)   Final Result      Hypoinflation without other evidence for acute cardiopulmonary disease.      US-RENAL    (Results Pending)        Assessment/Plan  * Metabolic acidosis- (present on admission)  Assessment & Plan  On admission, CO2 was 9, anion gap was 22.  No significant improvement.,  Anion gap is 17 today, CO2 is 11.  On sodium bicarb.  No positive cultures.    Atrial fibrillation (HCC)- (present on admission)  Assessment & Plan  Patient developed A-fib with RVR.  Blood pressure was soft.  Home Norvasc and losartan were held.  Amiodarone and Eliquis was started.    Hydronephrosis- (present on admission)  Assessment & Plan  CT abdomen and pelvis showed left renal stone, left-sided hydronephrosis secondary to a UPJ stenosis versus parapelvic cyst formation.     Per Urology evaluation on 7/18/2025, patient reportedly is supposed to be doing CIC 4 times a day at home, he was recommended to perform PVR every 4 hours and have a Johns catheter paced if he is unable to perform CIC 4 times a day. Flomax was started    Creatinine increased from 3.43 -> 4.06. Discussed with Urology, renal US ordered. Further recommendations from Urology are pending.    Acute renal failure (ARF) (HCC)- (present on admission)  Assessment & Plan  Creatinine was 2.6 on admission, has been slowly increasing, 4.06 today.  CT abdomen with pelvis showed  left renal stone, left-sided hydronephrosis secondary to a UPJ stenosis versus parapelvic cyst formation.  UA showed no evidence of bacteria.  I have reached out to urology for recommendations.    Elevated troponin- (present on admission)  Assessment & Plan  Troponin was 58 -> 49 -> 48.  EKG showed RBBB and LAFB, no significant change from previous EKG a few years ago. Denies chest pain.  Elevated  troponin was likely due to demand ischemia from A-fib with RVR, and YADI    Normocytic anemia- (present on admission)  Assessment & Plan  Hemoglobin was 12.8 on admission.  Iron was low, oral iron supplements were started.    Hypomagnesemia- (present on admission)  Assessment & Plan  Resolved.  Monitor    Hypocalcemia- (present on admission)  Assessment & Plan  Resolved.  Monitor    Stage 3a chronic kidney disease- (present on admission)  Assessment & Plan  Creatinine has been increasing, 2.6 on admission, 3.43 today. CT abdomen and pelvis showed left renal stone, left-sided hydronephrosis secondary to a UPJ stenosis versus parapelvic cyst formation.    Hypothyroid- (present on admission)  Assessment & Plan  Continue home levothyroxine    Hyperlipidemia- (present on admission)  Assessment & Plan  Continue home atorvastatin    Hypertension- (present on admission)  Assessment & Plan  Patient has been soft.  Continue to hold home losartan and amlodipine.  Monitor closely         VTE prophylaxis: Eliquis

## 2025-07-19 NOTE — PROGRESS NOTES
Late Entry:  Bedside report received from kristal RNAileen Patient awake and alert in bed AOx4. Pt is on room air. Pt has no complaints of pain. Water pitcher refilled. Bed is locked and in low position with bed alarm and strip alarm on. Reviewed plan of care with patient. Call light within reach. No other needs at this time.

## 2025-07-19 NOTE — PROGRESS NOTES
Assumed care of the patient. He is resting in bed, easy to wake. VSS on RA    Saline locked, condom cath in place, draining.    Call light in reach

## 2025-07-19 NOTE — PROGRESS NOTES
Telemetry shift summary    Rhythm: Afib, SA  HR:   Ectopy: F-PVC, PAC, R-Big, Trig    Measurements: - / .10 / .35    Normal values  Rhythm SR  HR   Measurements: 0.12-0.20/0.08-0.10/0.30-0.52

## 2025-07-19 NOTE — PROGRESS NOTES
Telemetry Shift Summary     Rhythm: SA/AFIB  HR:   Ectopy: r-obig, fPAC, fPVC, fTrigem    Measurements: ---/0.133/----    Normal Values  Rhythm: SR  HR:   Measurements: 0.12-0.20/0.08-0.10/0.30-0.52

## 2025-07-19 NOTE — CARE PLAN
The patient is Stable - Low risk of patient condition declining or worsening    Shift Goals  Clinical Goals: (P) Monitor HR, patient safety, pt comfort  Patient Goals: (P) Get discharged  Family Goals: ALLIE    Progress made toward(s) clinical / shift goals:      Problem: Knowledge Deficit - Standard  Goal: Patient and family/care givers will demonstrate understanding of plan of care, disease process/condition, diagnostic tests and medications  Outcome: Progressing  Note: 1. Patient and family/caregiver oriented to unit, equipment, visitation policy and means for communicating concern 2. Complete/review Learning Assessment 3. Assess knowledge level of disease process/condition, treatment plan, diagnostic tests and medications 4. Explain disease process/condition, treatment plan, diagnostic tests and medications      Problem: Urinary - Renal Perfusion  Goal: Ability to achieve and maintain adequate renal perfusion and functioning will improve  Outcome: Progressing  Note:   1.  Urine output will remain greater than 0.5ml/Kg/HR 2.  Monitor amount and/or characteristics of urine per order/policy. Specific gravity per order/policy 3.  Assess signs and symptoms of renal dysfunction  Expected end:          Patient is not progressing towards the following goals:  NA

## 2025-07-19 NOTE — THERAPY
"Occupational Therapy   Initial Evaluation     Patient Name:  Thomas Slater  Age:  80 y.o., Sex:  male  Medical Record #:  [id number]  Today's Date:  7/19/2025     Precautions  Medical: (P) Fall Risk    Assessment  Patient is 80 y.o. male with a diagnosis of metabolic acidosis. Additional factors influencing patient status / progress: Lives alone in 2nd floor apt (no elevator), no family nearby. IPLOF, drives; no prior services. Education on role of OT, DME/AE use, home safety/fall prec's during ADL's. Pt performs most ADL's with Spv/SBA (and showered with nrsg earlier -mostly standing, SBA). Ambulates with FWW vs HHA; cues to slow down. A bit impulsive when up on feet. Condom cath in place. See below for CLOF,recs. No further acute OT needs at this time.        Plan  Occupational Therapy Initial Treatment Plan   Duration: (P) Discharge Needs Only    DC Equipment Recommendations: (P) Unable to determine at this time, Tub Transfer Bench, Tub / Shower Seat  Discharge Recommendations: (P) Recommend home health for continued occupational therapy services (but pt declines). Services for help with chores, shopping/food delivery upon dc; pt declines.       Subjective  Pleasant and cooperative.  \"I am unsociable\".      Objective   07/19/25 1258   Prior Living Situation   Prior Services None   Housing / Facility 1 Story Apartment / Condo   Steps Into Home 12   Steps In Home 0   Elevator No   Bathroom Set up Bathtub / Shower Combination;Grab Bars   Equipment Owned Single Point Cane;Grab Bar(s) In Tub / Shower;Sock Aid;Reacher;Long Handled Shoehorn;Front-Wheel Walker   Lives with - Patient's Self Care Capacity Alone and Able to Care For Self   Comments Pt has lived in this apt x17 yrs, no family in town. Reports a fall ~ 2yrs ago while hiking. Rec tub bench vs sh seat, services possibly for chores/food delivery upon dc. States he has a pulley system to bring grocery bag up to his 2nd floor after going to store.   Prior " Level of ADL Function   Self Feeding Independent   Grooming / Hygiene Independent   Bathing Independent   Dressing Independent   Toileting Independent   Prior Level of IADL Function   Medication Management Independent   Laundry Independent   Kitchen Mobility Independent   Finances Independent   Home Management Independent   Shopping Independent   Prior Level Of Mobility Independent With Device in Home   Driving / Transportation Driving Independent   Occupation (Pre-Hospital Vocational) Retired Due To Age   Leisure Interests Unable To Determine At This Time   Precautions   Medical Fall Risk   Vitals   O2 Delivery Device None - Room Air   Pain 0 - 10 Group   Therapist Pain Assessment 0;Nurse Notified   Cognition    Cognition / Consciousness WDL   Level of Consciousness Alert   Comments Ox4. Cues for slowing down while up moving.   Passive ROM Upper Body   Passive ROM Upper Body WDL   Active ROM Upper Body   Active ROM Upper Body  X   Dominant Hand Right   Comments B shoulder flex limited to 0-90   Strength Upper Body   Upper Body Strength  WDL   Sensation Upper Body   Upper Extremity Sensation  WDL   Upper Body Muscle Tone   Upper Body Muscle Tone  WDL   Coordination Upper Body   Coordination WDL   Balance Assessment   Sitting Balance (Static) Good   Sitting Balance (Dynamic) Good   Standing Balance (Static) Fair +   Standing Balance (Dynamic) Fair   Weight Shift Sitting Good   Weight Shift Standing Fair   Bed Mobility    Supine to Sit Independent   Sit to Supine Independent   ADL Assessment   Eating Independent   Grooming Standby Assist;Standing   Bathing   (shower with nrsg earlier- 75% standing, 25% sitting; Spv)   Upper Body Dressing Supervision   Lower Body Dressing Supervision   Comments condom cath   How much help from another person does the patient currently need...   Putting on and taking off regular lower body clothing? 4   Bathing (including washing, rinsing, and drying)? 4   Toileting, which includes  using a toilet, bedpan, or urinal? 4   Putting on and taking off regular upper body clothing? 3   Taking care of personal grooming such as brushing teeth? 4   Eating meals? 4   6 Clicks Daily Activity Score 23   Functional Mobility   Sit to Stand Supervised   Bed, Chair, Wheelchair Transfer Standby Assist   Toilet Transfers Standby Assist   Transfer Method Stand Step   Comments walks with FWW; SBA, cues. Then HHA   Activity Tolerance   Sitting in Chair 1 hr   Sitting Edge of Bed 13   Standing 7   Education Group   Education Provided Home Safety;Activities of Daily Living;Adaptive Equipment   Role of Occupational Therapist Patient Response Patient;Acceptance;Explanation;Verbal Demonstration   Home Safety Patient Response Patient;Acceptance;Explanation;Verbal Demonstration   ADL Patient Response Patient;Acceptance;Explanation;Action Demonstration;Verbal Demonstration   Adaptive Equipment Patient Response Patient;Acceptance;Explanation   Occupational Therapy Initial Treatment Plan    Duration Discharge Needs Only   Anticipated Discharge Equipment and Recommendations   DC Equipment Recommendations Unable to determine at this time;Tub Transfer Bench;Tub / Shower Seat   Discharge Recommendations Recommend home health for continued occupational therapy services  (but pt declines)   Interdisciplinary Plan of Care Collaboration   IDT Collaboration with  Nursing;Certified Nursing Assistant   Patient Position at End of Therapy Seated;Chair Alarm On;Call Light within Reach;Tray Table within Reach;Phone within Reach   Collaboration Comments OT Eval, 1x only. Pt appears near baseline.

## 2025-07-20 PROBLEM — E83.42 HYPOMAGNESEMIA: Status: RESOLVED | Noted: 2025-07-16 | Resolved: 2025-07-20

## 2025-07-20 PROBLEM — E83.51 HYPOCALCEMIA: Status: RESOLVED | Noted: 2025-07-16 | Resolved: 2025-07-20

## 2025-07-20 ASSESSMENT — ENCOUNTER SYMPTOMS
EYES NEGATIVE: 1
RESPIRATORY NEGATIVE: 1
MUSCULOSKELETAL NEGATIVE: 1
PSYCHIATRIC NEGATIVE: 1
NEUROLOGICAL NEGATIVE: 1
CARDIOVASCULAR NEGATIVE: 1
GASTROINTESTINAL NEGATIVE: 1

## 2025-07-20 ASSESSMENT — PAIN DESCRIPTION - PAIN TYPE
TYPE: ACUTE PAIN

## 2025-07-20 ASSESSMENT — FIBROSIS 4 INDEX: FIB4 SCORE: 2.5

## 2025-07-20 NOTE — PROGRESS NOTES
UROLOGY Progress Note    History of Present Illness:    Per my chart review, patient is an 80yoM with PMH including HTN, HLD, and CKD stage 3 who was sent to the ER by PCP on 7/15/2025 with abnormal lab results - metabolic acidosis and severe hypocalcemia. Urology was consulted when CT incidentally demonstrated left hydronephrosis in the setting of ARF, in the absence of ureteral stone.     Interval Updates:    7/20. Creatinine improved s/p caceres catheter placement, no need for L stent today per Dr. Mcrae as this is likely more related to bladder outlet obstruction.     7/19. Informed by RN this evening of elevated PVR. Ordering catheter placement. No abdominal/suprapubic discomfort on exam, no significant L flank pain. Discussed with patient the possible need for L ureteral stent placement tomorrow with Dr. Mcrae, if serum creatinine continues to worsen/not improve.    7/18. Pt is lying comfortably in bed in NAD. Family at bedside. He is afebrile, normotensive. He continues on flomax. His creatinine has increased and is now 3.43 (2.32), GFR 17 (28). Patient's family member has noted that he has not been performing CIC. He usually performs this 4x daily.     7/17. Patient denies any new pain, denies L flank pain. Reports increased difficulty emptying the bladder while here in hospital, though feels he is able to empty adequately with no bladder pain/pressure. Discussed trial of Flomax. Reviewed labs, chart notes. Discussed case with Dr. Mora.    7/16. On exam, patient is sitting up comfortably in bed. He denies any flank pain. Reports occasional R-sided back pain with physical activity relieved by massage. He states he did have a possible kidney injury 2/2 trauma in the [employer] several decades ago. Denies any voiding difficulty or UTI symptoms.     Review of Systems:      Constitutional: Denies fevers  Eyes: Denies changes in vision, no eye pain  Ears/Nose/Throat/Mouth: Denies nasal congestion or sore throat    Cardiovascular: no chest pain, no palpitations   Respiratory: no shortness of breath , Denies cough  Gastrointestinal/Hepatic: see HPI  Genitourinary: See HPI  Musculoskeletal/Rheum: Denies joint pain and swelling, no edema  Skin: Denies rash  Neurological: Denies headache, confusion, memory loss or focal weakness/parasthesias  Psychiatric: denies mood disorder   Endocrine: Elizabeth thyroid problems  Heme/Oncology/Lymph Nodes: Denies enlarged lymph nodes, denies brusing or known bleeding disorder  All other systems were reviewed and are negative (AMA/CMS criteria)                Past Medical History:   Past Medical History[1]  Active Hospital Problems    Diagnosis     Iron deficiency anemia [D50.9]     Acute renal failure (ARF) (HCC) [N17.9]     Hydronephrosis [N13.30]     Hypocalcemia [E83.51]     Hypomagnesemia [E83.42]     Normocytic anemia [D64.9]     Atrial fibrillation (HCC) [I48.91]     Electrolyte abnormality [E87.8]     Metabolic acidosis [E87.20]     Elevated troponin [R79.89]     Hypertension [I10]     Hyperlipidemia [E78.5]     Stage 3a chronic kidney disease [N18.31]     Hypothyroid [E03.9]        Past Surgical History:  Past Surgical History[2]    Hospital Medications:  Current Medications[3]    Current Outpatient Medications:  Prescriptions Prior to Admission[4]    Medication Allergy:  Allergies[5]    Family History:  Family History   Problem Relation Age of Onset    Heart Disease Neg Hx        Social History:  Social History     Socioeconomic History    Marital status: Single     Spouse name: Not on file    Number of children: Not on file    Years of education: Not on file    Highest education level: Not on file   Occupational History    Not on file   Tobacco Use    Smoking status: Former     Current packs/day: 0.00     Types: Cigarettes     Quit date: 1972     Years since quittin.5    Smokeless tobacco: Never   Substance and Sexual Activity    Alcohol use: No    Drug use: Not Currently     "Sexual activity: Not on file   Other Topics Concern    Not on file   Social History Narrative    Not on file     Social Drivers of Health     Financial Resource Strain: Not on file   Food Insecurity: No Food Insecurity (7/16/2025)    Hunger Vital Sign     Worried About Running Out of Food in the Last Year: Never true     Ran Out of Food in the Last Year: Never true   Transportation Needs: No Transportation Needs (7/16/2025)    PRAPARE - Transportation     Lack of Transportation (Medical): No     Lack of Transportation (Non-Medical): No   Physical Activity: Not on file   Stress: Not on file   Social Connections: Not on file   Intimate Partner Violence: Not At Risk (7/15/2025)    Humiliation, Afraid, Rape, and Kick questionnaire     Fear of Current or Ex-Partner: No     Emotionally Abused: No     Physically Abused: No     Sexually Abused: No   Housing Stability: Low Risk  (7/16/2025)    Housing Stability Vital Sign     Unable to Pay for Housing in the Last Year: No     Number of Times Moved in the Last Year: 0     Homeless in the Last Year: No         Physical Exam:  Vitals/ General Appearance:   Weight/BMI: Body mass index is 26.59 kg/m².  /63   Pulse 81   Temp 36.5 °C (97.7 °F) (Temporal)   Resp 18   Ht 1.74 m (5' 8.5\")   Wt 80.5 kg (177 lb 7.5 oz)   SpO2 93%   Vitals:    07/19/25 2307 07/20/25 0310 07/20/25 0506 07/20/25 0732   BP: 122/61 117/59 109/51 117/63   Pulse: (!) 103 74 74 81   Resp: 18 18 18 18   Temp: 36.3 °C (97.4 °F) 36.6 °C (97.8 °F)  36.5 °C (97.7 °F)   TempSrc: Temporal Temporal  Temporal   SpO2: 96% 95% 96% 93%   Weight:  80.5 kg (177 lb 7.5 oz)     Height:         Oxygen Therapy:  Pulse Oximetry: 93 %, O2 Delivery Device: None - Room Air    Constitutional:  No acute distress  HENMT:  Normocephalic, Atraumatic  Eyes:  EOMI  Neck:  Normal range of motion  Lungs:  Normal respiratory effort  : No caceres catheter  Skin: Warm, Dry  Neurologic: No focal deficits  Psychiatric: Affect normal, " Judgment normal, Mood normal.      MDM (Data Review):     Records reviewed and summarized in current documentation    Lab Data Review:  Recent Results (from the past 24 hours)   MAGNESIUM    Collection Time: 07/19/25  9:10 PM   Result Value Ref Range    Magnesium 1.7 1.5 - 2.5 mg/dL   PHOSPHORUS    Collection Time: 07/19/25  9:10 PM   Result Value Ref Range    Phosphorus 3.2 2.5 - 4.5 mg/dL   CBC WITHOUT DIFFERENTIAL    Collection Time: 07/20/25  1:42 AM   Result Value Ref Range    WBC 9.3 4.8 - 10.8 K/uL    RBC 3.26 (L) 4.70 - 6.10 M/uL    Hemoglobin 10.1 (L) 14.0 - 18.0 g/dL    Hematocrit 30.1 (L) 42.0 - 52.0 %    MCV 92.3 81.4 - 97.8 fL    MCH 31.0 27.0 - 33.0 pg    MCHC 33.6 32.3 - 36.5 g/dL    RDW 44.1 35.9 - 50.0 fL    Platelet Count 217 164 - 446 K/uL    MPV 11.4 9.0 - 12.9 fL   Basic Metabolic Panel    Collection Time: 07/20/25  1:42 AM   Result Value Ref Range    Sodium 140 135 - 145 mmol/L    Potassium 3.8 3.6 - 5.5 mmol/L    Chloride 111 96 - 112 mmol/L    Co2 15 (L) 20 - 33 mmol/L    Glucose 96 65 - 99 mg/dL    Bun 57 (H) 8 - 22 mg/dL    Creatinine 3.64 (H) 0.50 - 1.40 mg/dL    Calcium 8.4 (L) 8.5 - 10.5 mg/dL    Anion Gap 14.0 7.0 - 16.0   ESTIMATED GFR    Collection Time: 07/20/25  1:42 AM   Result Value Ref Range    GFR (CKD-EPI) 16 (A) >60 mL/min/1.73 m 2   MAGNESIUM    Collection Time: 07/20/25  9:10 AM   Result Value Ref Range    Magnesium 1.6 1.5 - 2.5 mg/dL   PHOSPHORUS    Collection Time: 07/20/25  9:10 AM   Result Value Ref Range    Phosphorus 2.8 2.5 - 4.5 mg/dL       Imaging/Procedures Review:    Reviewed    MDM (Assessment and Plan):     Active Hospital Problems    Diagnosis     Iron deficiency anemia [D50.9]     Acute renal failure (ARF) (HCC) [N17.9]     Hydronephrosis [N13.30]     Hypocalcemia [E83.51]     Hypomagnesemia [E83.42]     Normocytic anemia [D64.9]     Atrial fibrillation (HCC) [I48.91]     Electrolyte abnormality [E87.8]     Metabolic acidosis [E87.20]     Elevated troponin  [R79.89]     Hypertension [I10]     Hyperlipidemia [E78.5]     Stage 3a chronic kidney disease [N18.31]     Hypothyroid [E03.9]      80yoM admitted for metabolic acidosis, electrolyte abnormalities, ARF, sepsis - urology consulted when patient found to have left hydronephrosis 2/2 possible UPJ-O vs. L parapelvic cyst on CT imaging in the ER.     Creatinine initially improved earlier this week, though increased >4.0 with elevated PVR, now decreasing again s/p caceres catheter placement  AFVSS  No abdominal/suprapubic discomfort on exam, no significant L flank pain    Plan:  - Continue caceres catheter  - Trend renal function s/p caceres placement; given improvement today, will hold off on need for L stent per Dr. Mcrae  - Ok for diet today from urology standpoint  - Urology following    POC discussed with patient, urology team - Dr. Thor Zaman, P.A.-C.   [address] [place] [address]   [phone]              [1]   Past Medical History:  Diagnosis Date    Abnormal EKG     Hyperlipidemia     Hypertension    [2] History reviewed. No pertinent surgical history.  [3]   Current Facility-Administered Medications:     ferrous sulfate tablet 325 mg, 325 mg, Oral, QDAY with Breakfast, Nader Marlow M.D., 325 mg at 07/19/25 0901    amiodarone (Cordarone) tablet 200 mg, 200 mg, Oral, Q DAY, Nader Marlow M.D., 200 mg at 07/20/25 0506    tamsulosin (Flomax) capsule 0.4 mg, 0.4 mg, Oral, AFTER DINNER, Arianne Zaman, P.A.-C., 0.4 mg at 07/19/25 1807    LR (Bolus) infusion 500 mL, 500 mL, Intravenous, Once PRN, Navya Perry M.D.    dextrose 5% infusion, , Intravenous, Continuous, Nader Marlow M.D., Stopped at 07/17/25 0827    apixaban (Eliquis) tablet 2.5 mg, 2.5 mg, Oral, BID, Nader Marlow M.D., 2.5 mg at 07/19/25 1808    sodium bicarbonate tablet 650 mg, 650 mg, Oral, TID, Nader Marlow M.D., 650 mg at 07/19/25 2148    atorvastatin (Lipitor) tablet 10 mg, 10 mg, Oral,  "Nightly, Navya Perry M.D., 10 mg at 07/19/25 2148    levothyroxine (Synthroid) tablet 112 mcg, 112 mcg, Oral, AM ES, Navya Perry M.D., 112 mcg at 07/20/25 0506    acetaminophen (Tylenol) tablet 650 mg, 650 mg, Oral, Q6HRS PRN, Navya Perry M.D.    senna-docusate (Pericolace Or Senokot S) 8.6-50 MG per tablet 2 Tablet, 2 Tablet, Oral, Q EVENING **AND** polyethylene glycol/lytes (Miralax) Packet 1 Packet, 1 Packet, Oral, QDAY PRN, Navya Perry M.D.  [4]   Medications Prior to Admission   Medication Sig Dispense Refill Last Dose/Taking    vitamin D3 (CHOLECALCIFEROL) 25 MCG (1000 UT) Tab Take 5,000 Units by mouth every day.   7/14/2025 Morning    levothyroxine (SYNTHROID) 112 MCG Tab Take 112 mcg by mouth every morning on an empty stomach.   7/14/2025 Morning    amLODIPine (NORVASC) 5 MG Tab Take 5 mg by mouth every day.   7/14/2025 Morning    losartan (COZAAR) 100 MG Tab Take 100 mg by mouth every day.   7/14/2025 Morning    Multiple Vitamins-Minerals (CENTRUM SILVER PO) Take  by mouth. \"150 multivitamin\"   7/14/2025 Morning    aspirin EC (ECOTRIN) 81 MG Tablet Delayed Response Take 81 mg by mouth every day.   7/14/2025 Morning    atorvastatin (LIPITOR) 10 MG Tab Take 10 mg by mouth every evening.   7/14/2025 Morning   [5] No Known Allergies    "

## 2025-07-20 NOTE — PROGRESS NOTES
Hospital Medicine Daily Progress Note    Date of Service  7/20/2025    Chief Complaint  Thomas Slater is a 80 y.o. male sent to the ER on 7/15/2025 by his PCP due to abnormal labs (including low magnesium, low calcium, metabolic acidosis).     Hospital Course  Patient was admitted to the ICU, on admission, WBCs of 18.3, potassium was 3.4, anion gap was 20, CO2 was 10, creatinine was 2.6. Corrected calcium was 6.6, AST was 52, ALT 2 was 32. Iron was 12. Electrolytes were replaced. Iron supplements and sodium bicarb were started.    Patient developed A-fib with RVR. Amiodarone and Eliquis were started. Troponin was 58 -> 49 -> 48.  EKG showed RBBB and LAFB, no significant change from previous EKG a few years ago.  Creatinine was increasing.  CT abdomen and pelvis showed left renal stone, left-sided hydronephrosis secondary to a UPJ stenosis versus parapelvic cyst formation.    He was evaluated by urology.  Per urology, if creatinine rises, would consider left ureteral stent placement versus left NPT.    Per Urology evaluation on 7/18/2025, patient reportedly is supposed to be doing CIC 4 times a day at home, he is recommended to perform PVR every 4 hours and have a Johns catheter paced if he is unable to perform CIC 4 times a day. Flomax was started    Interval Problem Update  Patient was noted to have low urine output with bladder scan showing 1200 cc of urine on 7/19/2025, Johns catheter was placed.  Creatinine improved from 4.06 -> 3.64 today.  Per urology, patient likely has bladder outlet obstruction.    Blood cultures have been negative.      I have discussed this patient's plan of care and discharge plan at IDT rounds today with Case Management, Nursing, Nursing leadership, and other members of the IDT team.    Consultants/Specialty  critical care and urology    Code Status  Full Code    Disposition  The patient is not medically cleared for discharge to home or a post-acute facility.  Anticipate  discharge to: home with close outpatient follow-up    I have placed the appropriate orders for post-discharge needs.    Review of Systems  Review of Systems   Constitutional:  Positive for malaise/fatigue.   HENT: Negative.     Eyes: Negative.    Respiratory: Negative.     Cardiovascular: Negative.    Gastrointestinal: Negative.    Genitourinary: Negative.    Musculoskeletal: Negative.    Skin: Negative.    Neurological: Negative.    Endo/Heme/Allergies: Negative.    Psychiatric/Behavioral: Negative.          Physical Exam  Temp:  [36.3 °C (97.3 °F)-36.6 °C (97.9 °F)] 36.4 °C (97.6 °F)  Pulse:  [] 80  Resp:  [18] 18  BP: (109-148)/(51-80) 144/60  SpO2:  [92 %-96 %] 93 %    Physical Exam  HENT:      Head: Normocephalic.      Nose: Nose normal.      Mouth/Throat:      Mouth: Mucous membranes are moist.   Eyes:      Pupils: Pupils are equal, round, and reactive to light.   Cardiovascular:      Rate and Rhythm: Normal rate.   Pulmonary:      Effort: Pulmonary effort is normal.   Abdominal:      Palpations: Abdomen is soft.   Musculoskeletal:      Cervical back: Normal range of motion.      Right lower leg: No edema.      Left lower leg: No edema.   Skin:     General: Skin is warm.   Neurological:      General: No focal deficit present.      Mental Status: He is alert.   Psychiatric:         Mood and Affect: Mood normal.         Fluids    Intake/Output Summary (Last 24 hours) at 7/20/2025 1556  Last data filed at 7/20/2025 0524  Gross per 24 hour   Intake 500 ml   Output 2950 ml   Net -2450 ml        Laboratory  Recent Labs     07/19/25  0030 07/20/25  0142   WBC 10.3 9.3   RBC 3.48* 3.26*   HEMOGLOBIN 10.8* 10.1*   HEMATOCRIT 32.0* 30.1*   MCV 92.0 92.3   MCH 31.0 31.0   MCHC 33.8 33.6   RDW 43.7 44.1   PLATELETCT 216 217   MPV 11.4 11.4     Recent Labs     07/18/25  0143 07/19/25  0030 07/20/25  0142   SODIUM 139 139 140   POTASSIUM 3.6 4.0 3.8   CHLORIDE 111 110 111   CO2 11* 13* 15*   GLUCOSE 94 98 96   BUN 54*  58* 57*   CREATININE 3.43* 4.06* 3.64*   CALCIUM 8.1* 8.4* 8.4*                     Imaging  CT-ABDOMEN-PELVIS W/O   Final Result      1.  Atherosclerosis.   2.  Left renal stone.   3.  Left-sided hydronephrosis secondary to a UPJ stenosis versus parapelvic cyst formation.   4.  Enlarged prostate.   5.  No evidence for bowel obstruction, diverticulitis, or appendicitis.         DX-CHEST-PORTABLE (1 VIEW)   Final Result      Hypoinflation without other evidence for acute cardiopulmonary disease.           Assessment/Plan  Atrial fibrillation (HCC)- (present on admission)  Assessment & Plan  Patient developed A-fib with RVR.  Blood pressure was soft.  Home Norvasc and losartan were held.  Amiodarone and Eliquis was started.    Hydronephrosis- (present on admission)  Assessment & Plan  CT abdomen and pelvis showed left renal stone, left-sided hydronephrosis secondary to a UPJ stenosis versus parapelvic cyst formation.     Per Urology evaluation on 7/18/2025, patient reportedly is supposed to be doing CIC 4 times a day at home, he was recommended to perform PVR every 4 hours and have a Johns catheter paced if he is unable to perform CIC 4 times a day. Flomax was started    Creatinine increased from 3.43 -> 4.06.  Patient had low urine output with 1200 cc on bladder scan.  Johns catheter placed.  Per urology, patient likely has bladder outlet obstruction.  Further recommendations per urology    Acute renal failure (ARF) (HCC)- (present on admission)  Assessment & Plan  Creatinine was 2.6 on admission, has been slowly increasing, 4.06 today.  CT abdomen with pelvis showed  left renal stone, left-sided hydronephrosis secondary to a UPJ stenosis versus parapelvic cyst formation.  UA showed no evidence of bacteria.  I have reached out to urology for recommendations.    Elevated troponin- (present on admission)  Assessment & Plan  Troponin was 58 -> 49 -> 48.  EKG showed RBBB and LAFB, no significant change from previous EKG  a few years ago. Denies chest pain.  Elevated troponin was likely due to demand ischemia from A-fib with RVR, and YADI    Stage 3a chronic kidney disease- (present on admission)  Assessment & Plan  Creatinine has been increasing, was 2.6 on admission, 4.06 on 7/19/2025, Johns catheter was placed on 7/19/2025 due to low urine output with 1200 cc on bladder scan.  Creatinine has improved to 3.64 today.  Per urology, patient likely has bladder outlet obstruction.  Further recommendations per urology.     Normocytic anemia- (present on admission)  Assessment & Plan  Hemoglobin was 12.8 on admission.  Iron was low, oral iron supplements were started.    Metabolic acidosis- (present on admission)  Assessment & Plan  On admission, CO2 was 9, anion gap was 22.  No significant improvement.  Anion gap inormalized.  On sodium bicarb.  No positive cultures.    Hypothyroid- (present on admission)  Assessment & Plan  Continue home levothyroxine    Hyperlipidemia- (present on admission)  Assessment & Plan  Continue home atorvastatin    Hypertension- (present on admission)  Assessment & Plan  Patient has been soft.  Continue to hold home losartan and amlodipine.  Monitor closely         VTE prophylaxis: Eliquis

## 2025-07-20 NOTE — CARE PLAN
The patient is Stable - Low risk of patient condition declining or worsening    Shift Goals  Clinical Goals: Monitor labs and vitals, safety  Patient Goals: Plan of care updates, rest  Family Goals: ALLIE    Progress made toward(s) clinical / shift goals:    Problem: Knowledge Deficit - Standard  Goal: Patient and family/care givers will demonstrate understanding of plan of care, disease process/condition, diagnostic tests and medications  Outcome: Progressing  Note: Patient's plan of care explained to patient. Patient verbalize understanding. Patient had a lot of questions for this RN to ask the MD, however, when MD attempted to speak to the patient, the patient was not feeling like talking. Patient encouraged to talk to MD when she comes around.        Patient is not progressing towards the following goals:

## 2025-07-20 NOTE — CARE PLAN
The patient is Stable - Low risk of patient condition declining or worsening    Shift Goals  Clinical Goals: Monitor HR, Safety, and NPO at midnight  Patient Goals: Go home  Family Goals: ALLIE    Progress made toward(s) clinical / shift goals: Pt is AO x4. Pt is on room air. Pt had no complaints of pain during this shift. NPO at midnight. Pt was educated on the importance of a call light. VSS. No other needs at this time. Pt has call light within reach, bed in lowest position, and caceres care was completed.      Problem: Fall Risk  Goal: Patient will remain free from falls  Outcome: Progressing     Problem: Physical Regulation  Goal: Diagnostic test results will improve  Outcome: Progressing       Patient is not progressing towards the following goals:

## 2025-07-20 NOTE — PROGRESS NOTES
UROLOGY Progress Note    History of Present Illness:    Per my chart review, patient is an 80yoM with PMH including HTN, HLD, and CKD stage 3 who was sent to the ER by PCP on 7/15/2025 with abnormal lab results - metabolic acidosis and severe hypocalcemia. Urology was consulted when CT incidentally demonstrated left hydronephrosis in the setting of ARF, in the absence of ureteral stone.     Interval Updates:    7/19. Informed by RN this evening of elevated PVR. Ordering catheter placement. No abdominal/suprapubic discomfort on exam, no significant L flank pain. Discussed with patient the possible need for L ureteral stent placement tomorrow with Dr. Mcrae, if serum creatinine continues to worsen/not improve.    7/18. Pt is lying comfortably in bed in NAD. Family at bedside. He is afebrile, normotensive. He continues on flomax. His creatinine has increased and is now 3.43 (2.32), GFR 17 (28). Patient's family member has noted that he has not been performing CIC. He usually performs this 4x daily.     7/17. Patient denies any new pain, denies L flank pain. Reports increased difficulty emptying the bladder while here in hospital, though feels he is able to empty adequately with no bladder pain/pressure. Discussed trial of Flomax. Reviewed labs, chart notes. Discussed case with Dr. Mora.    7/16. On exam, patient is sitting up comfortably in bed. He denies any flank pain. Reports occasional R-sided back pain with physical activity relieved by massage. He states he did have a possible kidney injury 2/2 trauma in the [employer] several decades ago. Denies any voiding difficulty or UTI symptoms.     Review of Systems:      Constitutional: Denies fevers  Eyes: Denies changes in vision, no eye pain  Ears/Nose/Throat/Mouth: Denies nasal congestion or sore throat   Cardiovascular: no chest pain, no palpitations   Respiratory: no shortness of breath , Denies cough  Gastrointestinal/Hepatic: see HPI  Genitourinary: See  HPI  Musculoskeletal/Rheum: Denies joint pain and swelling, no edema  Skin: Denies rash  Neurological: Denies headache, confusion, memory loss or focal weakness/parasthesias  Psychiatric: denies mood disorder   Endocrine: Elizabeth thyroid problems  Heme/Oncology/Lymph Nodes: Denies enlarged lymph nodes, denies brusing or known bleeding disorder  All other systems were reviewed and are negative (AMA/CMS criteria)                Past Medical History:   Past Medical History[1]  Active Hospital Problems    Diagnosis     Iron deficiency anemia [D50.9]     Acute renal failure (ARF) (HCC) [N17.9]     Hydronephrosis [N13.30]     Hypocalcemia [E83.51]     Hypomagnesemia [E83.42]     Normocytic anemia [D64.9]     Atrial fibrillation (HCC) [I48.91]     Electrolyte abnormality [E87.8]     Metabolic acidosis [E87.20]     Elevated troponin [R79.89]     Hypertension [I10]     Hyperlipidemia [E78.5]     Stage 3a chronic kidney disease [N18.31]     Hypothyroid [E03.9]        Past Surgical History:  Past Surgical History[2]    Hospital Medications:  Current Medications[3]    Current Outpatient Medications:  Prescriptions Prior to Admission[4]    Medication Allergy:  Allergies[5]    Family History:  Family History   Problem Relation Age of Onset    Heart Disease Neg Hx        Social History:  Social History     Socioeconomic History    Marital status: Single     Spouse name: Not on file    Number of children: Not on file    Years of education: Not on file    Highest education level: Not on file   Occupational History    Not on file   Tobacco Use    Smoking status: Former     Current packs/day: 0.00     Types: Cigarettes     Quit date: 1972     Years since quittin.5    Smokeless tobacco: Never   Substance and Sexual Activity    Alcohol use: No    Drug use: Not Currently    Sexual activity: Not on file   Other Topics Concern    Not on file   Social History Narrative    Not on file     Social Drivers of Health     Financial  "Resource Strain: Not on file   Food Insecurity: No Food Insecurity (7/16/2025)    Hunger Vital Sign     Worried About Running Out of Food in the Last Year: Never true     Ran Out of Food in the Last Year: Never true   Transportation Needs: No Transportation Needs (7/16/2025)    PRAPARE - Transportation     Lack of Transportation (Medical): No     Lack of Transportation (Non-Medical): No   Physical Activity: Not on file   Stress: Not on file   Social Connections: Not on file   Intimate Partner Violence: Not At Risk (7/15/2025)    Humiliation, Afraid, Rape, and Kick questionnaire     Fear of Current or Ex-Partner: No     Emotionally Abused: No     Physically Abused: No     Sexually Abused: No   Housing Stability: Low Risk  (7/16/2025)    Housing Stability Vital Sign     Unable to Pay for Housing in the Last Year: No     Number of Times Moved in the Last Year: 0     Homeless in the Last Year: No         Physical Exam:  Vitals/ General Appearance:   Weight/BMI: Body mass index is 26.66 kg/m².  /65   Pulse 75   Temp 36.4 °C (97.6 °F) (Temporal)   Resp 18   Ht 1.74 m (5' 8.5\")   Wt 80.7 kg (177 lb 14.6 oz)   SpO2 97%   Vitals:    07/19/25 0613 07/19/25 0738 07/19/25 1138 07/19/25 1525   BP: 137/71 136/63 (!) 152/67 131/65   Pulse: 91 78 77 75   Resp: 18 18 18 18   Temp: 36.1 °C (96.9 °F) 36.6 °C (97.8 °F) 36.3 °C (97.4 °F) 36.4 °C (97.6 °F)   TempSrc: Temporal Temporal Temporal Temporal   SpO2: 95% 93% 97% 97%   Weight:       Height:         Oxygen Therapy:  Pulse Oximetry: 97 %, O2 (LPM): 0, O2 Delivery Device: None - Room Air    Constitutional:  No acute distress  HENMT:  Normocephalic, Atraumatic  Eyes:  EOMI  Neck:  Normal range of motion  Lungs:  Normal respiratory effort  : No caceres catheter  Skin: Warm, Dry  Neurologic: No focal deficits  Psychiatric: Affect normal, Judgment normal, Mood normal.      MDM (Data Review):     Records reviewed and summarized in current documentation    Lab Data " Review:  Recent Results (from the past 24 hours)   MAGNESIUM    Collection Time: 07/18/25  8:28 PM   Result Value Ref Range    Magnesium 1.8 1.5 - 2.5 mg/dL   PHOSPHORUS    Collection Time: 07/18/25  8:28 PM   Result Value Ref Range    Phosphorus 3.3 2.5 - 4.5 mg/dL   CBC WITHOUT DIFFERENTIAL    Collection Time: 07/19/25 12:30 AM   Result Value Ref Range    WBC 10.3 4.8 - 10.8 K/uL    RBC 3.48 (L) 4.70 - 6.10 M/uL    Hemoglobin 10.8 (L) 14.0 - 18.0 g/dL    Hematocrit 32.0 (L) 42.0 - 52.0 %    MCV 92.0 81.4 - 97.8 fL    MCH 31.0 27.0 - 33.0 pg    MCHC 33.8 32.3 - 36.5 g/dL    RDW 43.7 35.9 - 50.0 fL    Platelet Count 216 164 - 446 K/uL    MPV 11.4 9.0 - 12.9 fL   Basic Metabolic Panel    Collection Time: 07/19/25 12:30 AM   Result Value Ref Range    Sodium 139 135 - 145 mmol/L    Potassium 4.0 3.6 - 5.5 mmol/L    Chloride 110 96 - 112 mmol/L    Co2 13 (L) 20 - 33 mmol/L    Glucose 98 65 - 99 mg/dL    Bun 58 (H) 8 - 22 mg/dL    Creatinine 4.06 (H) 0.50 - 1.40 mg/dL    Calcium 8.4 (L) 8.5 - 10.5 mg/dL    Anion Gap 16.0 7.0 - 16.0   ESTIMATED GFR    Collection Time: 07/19/25 12:30 AM   Result Value Ref Range    GFR (CKD-EPI) 14 (A) >60 mL/min/1.73 m 2   MAGNESIUM    Collection Time: 07/19/25 12:30 AM   Result Value Ref Range    Magnesium 1.7 1.5 - 2.5 mg/dL   PHOSPHORUS    Collection Time: 07/19/25 12:30 AM   Result Value Ref Range    Phosphorus 3.7 2.5 - 4.5 mg/dL       Imaging/Procedures Review:    Reviewed    MDM (Assessment and Plan):     Active Hospital Problems    Diagnosis     Iron deficiency anemia [D50.9]     Acute renal failure (ARF) (HCC) [N17.9]     Hydronephrosis [N13.30]     Hypocalcemia [E83.51]     Hypomagnesemia [E83.42]     Normocytic anemia [D64.9]     Atrial fibrillation (HCC) [I48.91]     Electrolyte abnormality [E87.8]     Metabolic acidosis [E87.20]     Elevated troponin [R79.89]     Hypertension [I10]     Hyperlipidemia [E78.5]     Stage 3a chronic kidney disease [N18.31]     Hypothyroid [E03.9]       80yoM admitted for metabolic acidosis, electrolyte abnormalities, ARF, sepsis - urology consulted when patient found to have left hydronephrosis 2/2 possible UPJ-O vs. L parapelvic cyst on CT imaging in the ER.     Creatinine initially improved this week, though now increasing >4.0 with baseline ~1.26  AFVSS  No abdominal/suprapubic discomfort on exam, no significant L flank pain    Discussed with RN today caceres catheter placement (she has informed me of recent elevated PVR ~1L) and she is aware of NPO orders tonight    Plan:  - Informed by RN of elevated PVR, ordered caceres catheter placement 16Fr coude  - Trend renal function, check AM serum creatinine  - If no improvement/worsening creatinine level, patient will be NPO at [facility] for L ureteral stent placement tomorrow  - Urology following    POC discussed with patient, RN, hospital team, and urology team - Dr. Thor Zaman, P.A.-C.   [address] [facility] [place] [address]   [phone]              [1]   Past Medical History:  Diagnosis Date    Abnormal EKG     Hyperlipidemia     Hypertension    [2] History reviewed. No pertinent surgical history.  [3]   Current Facility-Administered Medications:     ferrous sulfate tablet 325 mg, 325 mg, Oral, QDAY with Breakfast, Nader Marlow M.D., 325 mg at 07/19/25 0901    amiodarone (Cordarone) tablet 200 mg, 200 mg, Oral, Q DAY, Nader Marlow M.D., 200 mg at 07/19/25 0617    tamsulosin (Flomax) capsule 0.4 mg, 0.4 mg, Oral, AFTER DINNER, Arianne Zaman, P.A.-C., 0.4 mg at 07/18/25 1651    LR (Bolus) infusion 500 mL, 500 mL, Intravenous, Once PRN, Navya Perry M.D.    dextrose 5% infusion, , Intravenous, Continuous, Nader Marlow M.D., Stopped at 07/17/25 0827    apixaban (Eliquis) tablet 2.5 mg, 2.5 mg, Oral, BID, Nader Marlow M.D., 2.5 mg at 07/19/25 0617    sodium bicarbonate tablet 650 mg, 650 mg, Oral, TID, Nader Marlow M.D., 650 mg at 07/19/25 0901     "atorvastatin (Lipitor) tablet 10 mg, 10 mg, Oral, Nightly, Navya Perry M.D., 10 mg at 07/18/25 2147    levothyroxine (Synthroid) tablet 112 mcg, 112 mcg, Oral, AM ES, Navya Perry M.D., 112 mcg at 07/19/25 0617    acetaminophen (Tylenol) tablet 650 mg, 650 mg, Oral, Q6HRS PRN, Navya Perry M.D.    senna-docusate (Pericolace Or Senokot S) 8.6-50 MG per tablet 2 Tablet, 2 Tablet, Oral, Q EVENING **AND** polyethylene glycol/lytes (Miralax) Packet 1 Packet, 1 Packet, Oral, QDAY PRN, Navya Perry M.D.  [4]   Medications Prior to Admission   Medication Sig Dispense Refill Last Dose/Taking    vitamin D3 (CHOLECALCIFEROL) 25 MCG (1000 UT) Tab Take 5,000 Units by mouth every day.   7/14/2025 Morning    levothyroxine (SYNTHROID) 112 MCG Tab Take 112 mcg by mouth every morning on an empty stomach.   7/14/2025 Morning    amLODIPine (NORVASC) 5 MG Tab Take 5 mg by mouth every day.   7/14/2025 Morning    losartan (COZAAR) 100 MG Tab Take 100 mg by mouth every day.   7/14/2025 Morning    Multiple Vitamins-Minerals (CENTRUM SILVER PO) Take  by mouth. \"150 multivitamin\"   7/14/2025 Morning    aspirin EC (ECOTRIN) 81 MG Tablet Delayed Response Take 81 mg by mouth every day.   7/14/2025 Morning    atorvastatin (LIPITOR) 10 MG Tab Take 10 mg by mouth every evening.   7/14/2025 Morning   [5] No Known Allergies    "

## 2025-07-20 NOTE — DOCUMENTATION QUERY
[facility]                                                                       Query Response Note      PATIENT:               ÓSCAR ALMANZA  ACCT #:                  [id number]  MRN:                     [id number]  :                      1945  ADMIT DATE:       7/15/2025 5:50 PM  DISCH DATE:          RESPONDING  PROVIDER #:        [id number]           QUERY TEXT:    Based on the findings above and further workup can this patients renal status be further specified.    The patient's Clinical Indicators include:  Findings: 7/15 HP: Acute renal failure CT of the abdomen and pelvis is showing left renal stone with left-sided hydronephrosis secondary to a UPJ stenosis versus pelvic cyst formation      Urology: CT incidentally demonstrated left hydronephrosis in the setting of ARF, in the absence of ureteral stone. Baseline creatinine 1.26    Labs 07/15 Creatinine 2.44 BUN 50  /16 Creatinine 2.32, 2.03, 2.32 BUN 47-50   Creatinine 3.43 BUN 54  / Creatinine 4.06 BUN 58  / Creatinine 3.64 BUN 57    07/15 UA: Casts >20, Epithelial cells 11-20 hyaline cast present     Treatment IV antibiotics, Urology Consult, imaging sodium bicarbonate, IV fluids     Risk Factors hydronephrosis      Nathalie Lopes RN BSN  Alvarez,  Clinical Documentation   [email or web address]  Connect via [email or web address] Messenger    Note: If you agree with a diagnosis listed above, please remember to include it in your concurrent daily documentation and onto the Discharge Summary.  Options provided:   -- Acute Kidney Injury/Acute Renal Failure with Acute Tubular Necrosis   -- Acute on chronic renal failure only   -- Other explanation (please specify)      Query created by: Nathalie Lopes on 2025 11:22 AM    RESPONSE TEXT:    Acute on chronic renal failure only          Electronically signed by:  SAMIR TOVAR MD 2025 12:10 PM

## 2025-07-20 NOTE — PROGRESS NOTES
Telemetry shift summary     Rhythm: SA, hx of afib  HR:   Ectopy: F-PVC, PAC, R-Big, Trig     Measurements: .16/ .16/ .37

## 2025-07-20 NOTE — PROGRESS NOTES
Bedside report received from nasirhilin RNBuffy. Patient awake and alert in bed AOx4. Pt is on room air. Pt had no complaints of pain. Bed is locked and in low position with bed alarm on. Reviewed plan of care with patient. Call light within reach. No other needs at this time.

## 2025-07-20 NOTE — PROGRESS NOTES
Telemetry shift summary    Rhythm: SA with BBB  HR:   Ectopy: R-PVC, Trig, Coup    Measurements: .13 / .14 / .44    Normal values  Rhythm SR  HR   Measurements: 0.12-0.20/0.08-0.10/0.30-0.52

## 2025-07-21 ENCOUNTER — PHARMACY VISIT (OUTPATIENT)
Dept: PHARMACY | Facility: MEDICAL CENTER | Age: 80
End: 2025-07-21
Payer: COMMERCIAL

## 2025-07-21 PROBLEM — E87.8 ELECTROLYTE ABNORMALITY: Status: RESOLVED | Noted: 2025-07-16 | Resolved: 2025-07-21

## 2025-07-21 PROBLEM — E87.20 METABOLIC ACIDOSIS: Status: RESOLVED | Noted: 2025-07-15 | Resolved: 2025-07-21

## 2025-07-21 PROCEDURE — RXMED WILLOW AMBULATORY MEDICATION CHARGE: Performed by: INTERNAL MEDICINE

## 2025-07-21 ASSESSMENT — FIBROSIS 4 INDEX: FIB4 SCORE: 2.15

## 2025-07-21 NOTE — PROGRESS NOTES
Late Entry:  Bedside report received from nasirhiLeonila ceballos RN. Patient awake and alert in bed AOx4. Pt was educated about POC from Charge RNBryan. Pt on room air. Pt had no complaints of pain at this time.  Bed is locked and in low position with bed alarm on. Reviewed plan of care with patient. Call light within reach. No other needs at this time.

## 2025-07-21 NOTE — PROGRESS NOTES
UROLOGY Progress Note    History of Present Illness:    Per my chart review, patient is an 80yoM with PMH including HTN, HLD, and CKD stage 3 who was sent to the ER by PCP on 7/15/2025 with abnormal lab results - metabolic acidosis and severe hypocalcemia. Urology was consulted when CT incidentally demonstrated left hydronephrosis in the setting of ARF, in the absence of ureteral stone.     Interval Updates:    7/21. Cr further improved to 2.16 (3.6) with caceres in place. Urine rust colored without clot in caceres tubing. Good UOP in 24h, >2300cc.     7/20. Creatinine improved s/p caceres catheter placement, no need for L stent today per Dr. Mcrae as this is likely more related to bladder outlet obstruction.       Review of Systems:      Constitutional: Denies fevers  Eyes: Denies changes in vision, no eye pain  Ears/Nose/Throat/Mouth: Denies nasal congestion or sore throat   Cardiovascular: no chest pain, no palpitations   Respiratory: no shortness of breath , Denies cough  Gastrointestinal/Hepatic: see HPI  Genitourinary: See HPI  Musculoskeletal/Rheum: Denies joint pain and swelling, no edema  Skin: Denies rash  Neurological: Denies headache, confusion, memory loss or focal weakness/parasthesias  Psychiatric: denies mood disorder   Endocrine: Elizabeth thyroid problems  Heme/Oncology/Lymph Nodes: Denies enlarged lymph nodes, denies brusing or known bleeding disorder  All other systems were reviewed and are negative (AMA/CMS criteria)                Past Medical History:   Past Medical History[1]  Active Hospital Problems    Diagnosis     Iron deficiency anemia [D50.9]     Acute renal failure (ARF) (HCC) [N17.9]     Hydronephrosis [N13.30]     Normocytic anemia [D64.9]     Atrial fibrillation (HCC) [I48.91]     Electrolyte abnormality [E87.8]     Metabolic acidosis [E87.20]     Elevated troponin [R79.89]     Hypertension [I10]     Hyperlipidemia [E78.5]     Stage 3a chronic kidney disease [N18.31]     Hypothyroid  [E03.9]        Past Surgical History:  Past Surgical History[2]    Hospital Medications:  Current Medications[3]    Current Outpatient Medications:  Prescriptions Prior to Admission[4]    Medication Allergy:  Allergies[5]    Family History:  Family History   Problem Relation Age of Onset    Heart Disease Neg Hx        Social History:  Social History     Socioeconomic History    Marital status: Single     Spouse name: Not on file    Number of children: Not on file    Years of education: Not on file    Highest education level: Not on file   Occupational History    Not on file   Tobacco Use    Smoking status: Former     Current packs/day: 0.00     Types: Cigarettes     Quit date: 1972     Years since quittin.5    Smokeless tobacco: Never   Substance and Sexual Activity    Alcohol use: No    Drug use: Not Currently    Sexual activity: Not on file   Other Topics Concern    Not on file   Social History Narrative    Not on file     Social Drivers of Health     Financial Resource Strain: Not on file   Food Insecurity: No Food Insecurity (2025)    Hunger Vital Sign     Worried About Running Out of Food in the Last Year: Never true     Ran Out of Food in the Last Year: Never true   Transportation Needs: No Transportation Needs (2025)    PRAPARE - Transportation     Lack of Transportation (Medical): No     Lack of Transportation (Non-Medical): No   Physical Activity: Not on file   Stress: Not on file   Social Connections: Not on file   Intimate Partner Violence: Not At Risk (7/15/2025)    Humiliation, Afraid, Rape, and Kick questionnaire     Fear of Current or Ex-Partner: No     Emotionally Abused: No     Physically Abused: No     Sexually Abused: No   Housing Stability: Low Risk  (2025)    Housing Stability Vital Sign     Unable to Pay for Housing in the Last Year: No     Number of Times Moved in the Last Year: 0     Homeless in the Last Year: No         Physical Exam:  Vitals/ General Appearance:  "  Weight/BMI: Body mass index is 25.67 kg/m².  /61   Pulse (!) 117   Temp 36.4 °C (97.6 °F) (Temporal)   Resp 17   Ht 1.74 m (5' 8.5\")   Wt 77.7 kg (171 lb 4.8 oz)   SpO2 97%   Vitals:    07/21/25 0035 07/21/25 0503 07/21/25 0631 07/21/25 0720   BP: (!) 148/77 (!) 148/79  131/61   Pulse: 85 80  (!) 117   Resp: 19 18  17   Temp: 36.6 °C (97.8 °F) 36.3 °C (97.4 °F)  36.4 °C (97.6 °F)   TempSrc: Temporal Temporal  Temporal   SpO2: 96% 97%  97%   Weight:   77.7 kg (171 lb 4.8 oz)    Height:         Oxygen Therapy:  Pulse Oximetry: 97 %, O2 (LPM): 0, O2 Delivery Device: None - Room Air    Constitutional:  No acute distress  HENMT:  Normocephalic, Atraumatic  Eyes:  EOMI  Neck:  Normal range of motion  Lungs:  Normal respiratory effort  : caceres draining rust colored urine without clots  Skin: Warm, Dry  Neurologic: No focal deficits  Psychiatric: Affect normal, Judgment normal, Mood normal.      MDM (Data Review):     Records reviewed and summarized in current documentation    Lab Data Review:  Recent Results (from the past 24 hours)   MAGNESIUM    Collection Time: 07/20/25  9:10 AM   Result Value Ref Range    Magnesium 1.6 1.5 - 2.5 mg/dL   PHOSPHORUS    Collection Time: 07/20/25  9:10 AM   Result Value Ref Range    Phosphorus 2.8 2.5 - 4.5 mg/dL   MAGNESIUM    Collection Time: 07/20/25 10:42 PM   Result Value Ref Range    Magnesium 1.5 1.5 - 2.5 mg/dL   PHOSPHORUS    Collection Time: 07/20/25 10:42 PM   Result Value Ref Range    Phosphorus 2.7 2.5 - 4.5 mg/dL   CBC WITHOUT DIFFERENTIAL    Collection Time: 07/21/25  1:53 AM   Result Value Ref Range    WBC 8.2 4.8 - 10.8 K/uL    RBC 3.09 (L) 4.70 - 6.10 M/uL    Hemoglobin 9.5 (L) 14.0 - 18.0 g/dL    Hematocrit 28.4 (L) 42.0 - 52.0 %    MCV 91.9 81.4 - 97.8 fL    MCH 30.7 27.0 - 33.0 pg    MCHC 33.5 32.3 - 36.5 g/dL    RDW 43.7 35.9 - 50.0 fL    Platelet Count 252 164 - 446 K/uL    MPV 10.8 9.0 - 12.9 fL   Basic Metabolic Panel    Collection Time: 07/21/25  " 1:53 AM   Result Value Ref Range    Sodium 141 135 - 145 mmol/L    Potassium 3.4 (L) 3.6 - 5.5 mmol/L    Chloride 112 96 - 112 mmol/L    Co2 17 (L) 20 - 33 mmol/L    Glucose 112 (H) 65 - 99 mg/dL    Bun 46 (H) 8 - 22 mg/dL    Creatinine 2.16 (H) 0.50 - 1.40 mg/dL    Calcium 8.0 (L) 8.5 - 10.5 mg/dL    Anion Gap 12.0 7.0 - 16.0   ESTIMATED GFR    Collection Time: 07/21/25  1:53 AM   Result Value Ref Range    GFR (CKD-EPI) 30 (A) >60 mL/min/1.73 m 2       Imaging/Procedures Review:    Reviewed    MDM (Assessment and Plan):     Active Hospital Problems    Diagnosis     Iron deficiency anemia [D50.9]     Acute renal failure (ARF) (HCC) [N17.9]     Hydronephrosis [N13.30]     Normocytic anemia [D64.9]     Atrial fibrillation (HCC) [I48.91]     Electrolyte abnormality [E87.8]     Metabolic acidosis [E87.20]     Elevated troponin [R79.89]     Hypertension [I10]     Hyperlipidemia [E78.5]     Stage 3a chronic kidney disease [N18.31]     Hypothyroid [E03.9]          Plan:  - Discussed at length with pt; will plan to discharge with caceres catheter when cleared by hospitalist  - [place] will arrange outpatient follow up for TOV, CIC teaching/supplies, and Mag3 renogram  - Can also increase tamsulosin to 0.8mg QD if not contraindicated from medicine perspective  - No further urologic intervention anticipated during this admission. Urology signing off, please call with questions.    Case discussed with Dr Coppola, who has directed this patient's plan of care.         MARIALUISA Tanner-C.   [address] [place] [address]   [phone]              [1]   Past Medical History:  Diagnosis Date    Abnormal EKG     Hyperlipidemia     Hypertension    [2] History reviewed. No pertinent surgical history.  [3]   Current Facility-Administered Medications:     aspirin EC tablet 81 mg, 81 mg, Oral, DAILY, Navya Perry M.D., 81 mg at 07/21/25 0606    ferrous sulfate tablet 325 mg, 325 mg, Oral, QDAY with Breakfast,  "Nader Marlow M.D., 325 mg at 07/21/25 0808    amiodarone (Cordarone) tablet 200 mg, 200 mg, Oral, Q DAY, Nader Marlow M.D., 200 mg at 07/21/25 0605    tamsulosin (Flomax) capsule 0.4 mg, 0.4 mg, Oral, AFTER DINNER, Arianne Zaman P.A.-C., 0.4 mg at 07/20/25 1750    LR (Bolus) infusion 500 mL, 500 mL, Intravenous, Once PRN, Navya Perry M.D.    apixaban (Eliquis) tablet 2.5 mg, 2.5 mg, Oral, BID, Nader Marlow M.D., 2.5 mg at 07/21/25 0605    sodium bicarbonate tablet 650 mg, 650 mg, Oral, TID, Nader Marlow M.D., 650 mg at 07/21/25 0808    atorvastatin (Lipitor) tablet 10 mg, 10 mg, Oral, Nightly, Navya Perry M.D., 10 mg at 07/20/25 2144    levothyroxine (Synthroid) tablet 112 mcg, 112 mcg, Oral, AM ES, Navya Perry M.D., 112 mcg at 07/21/25 0605    acetaminophen (Tylenol) tablet 650 mg, 650 mg, Oral, Q6HRS PRN, Navya Perry M.D.    senna-docusate (Pericolace Or Senokot S) 8.6-50 MG per tablet 2 Tablet, 2 Tablet, Oral, Q EVENING, 2 Tablet at 07/20/25 1750 **AND** polyethylene glycol/lytes (Miralax) Packet 1 Packet, 1 Packet, Oral, QDAY PRN, Navya Perry M.D.  [4]   Medications Prior to Admission   Medication Sig Dispense Refill Last Dose/Taking    vitamin D3 (CHOLECALCIFEROL) 25 MCG (1000 UT) Tab Take 5,000 Units by mouth every day.   7/14/2025 Morning    levothyroxine (SYNTHROID) 112 MCG Tab Take 112 mcg by mouth every morning on an empty stomach.   7/14/2025 Morning    amLODIPine (NORVASC) 5 MG Tab Take 5 mg by mouth every day.   7/14/2025 Morning    losartan (COZAAR) 100 MG Tab Take 100 mg by mouth every day.   7/14/2025 Morning    Multiple Vitamins-Minerals (CENTRUM SILVER PO) Take  by mouth. \"150 multivitamin\"   7/14/2025 Morning    aspirin EC (ECOTRIN) 81 MG Tablet Delayed Response Take 81 mg by mouth every day.   7/14/2025 Morning    atorvastatin (LIPITOR) 10 MG Tab Take 10 mg by mouth every evening.   7/14/2025 Morning "   [5] No Known Allergies

## 2025-07-21 NOTE — PROGRESS NOTES
Telemetry shift summary    Rhythm: SA with BBB  HR:   Ectopy: O/R-PVC, Big, Trig    Measurements: .16 / .12 / .40    Normal values  Rhythm SR  HR   Measurements: 0.12-0.20/0.08-0.10/0.30-0.52

## 2025-07-21 NOTE — DISCHARGE INSTRUCTIONS
Please hold your Norvasc and losartan for now until you have been reevaluated by your cardiologist    Please take apixaban 2.5 mg every 12 hours    Please take amiodarone 200 mg daily    Please continue sodium bicarbonate 1 tablet daily for 3 days, and follow-up with your primary care physician within 1 to 2 days to repeat and monitor labs    Please monitor and record your blood pressure at least 3 times a day and provide the readings to your primary care physician within the next few days    Please take tamsulosin daily    Please continue iron supplements    Please follow-up with urology within the next few days    Please follow-up with your cardiologist within 1 to 2 days    Follow-up with your primary care physician within 1 to 2 days

## 2025-07-21 NOTE — DISCHARGE SUMMARY
Discharge Summary    CHIEF COMPLAINT ON ADMISSION  Chief Complaint   Patient presents with    Diarrhea     Intermittent since April      Weakness     C/o increasing weakness    Abnormal Labs     Advised by PCP to come to ED for abnormal Ca+ lvl       Reason for Admission  Abnormal Labs     Admission Date  7/15/2025    CODE STATUS  Full Code    HPI & HOSPITAL COURSE  Thomas Slater is a 80 y.o. male sent to the ER on 7/15/2025 by his PCP due to abnormal labs (including low magnesium, low calcium, metabolic acidosis).      Patient was admitted to the ICU, on admission, WBCs of 18.3, potassium was 3.4, anion gap was 20, CO2 was 10, creatinine was 2.6. Corrected calcium was 6.6, AST was 52, ALT 2 was 32. Iron was 12. Electrolytes were replaced. Iron supplements and sodium bicarb were started.  LFTs normalized.     Patient developed A-fib with RVR. Amiodarone and Eliquis were started. Troponin was 58 -> 49 -> 48.  EKG showed RBBB and LAFB, no significant change from previous EKG a few years ago.  Creatinine was increasing.  CT abdomen and pelvis showed left renal stone, left-sided hydronephrosis secondary to a UPJ stenosis versus parapelvic cyst formation.     Patient was evaluated by Urology.  Per Urology evaluation on 7/18/2025, patient reportedly was supposed to be doing CIC 4 times a day at home. Flomax was started.      Patient was noted to have low urine output with bladder scan showing 1200 cc of urine on 7/19/2025, Johns catheter was placed.  Creatinine improved to 2.16. Per Urology, patient likely has bladder outlet obstruction.     Urology evaluated the patient today, and have cleared the patient to discharge home with [facility], they will arrange outpatient follow-up for POV, CIC teaching/supplies, and MAG3 renogram.    Patient has been advised to follow-up with his cardiologist for his atrial fibrillation.  Patient states that he has had atrial fibrillation in the past.  He has been placed on amiodarone  and Eliquis (2.5 mg BID). Home Norvasc and losartan have been held due to low blood pressure.  Patient has been advised to monitor his blood pressure at least 3 times a day and provide the readings to his primary care physician and cardiologist within the next few days.    Potassium was 3.4 today, replaced. Iron supplement has been added. Patient has been advised to take sodium bicarbonate tablets for the next few days and follow-up with his primary care physician within 1 to 2 days to monitor labs and follow-up care.        Therefore, he is discharged in fair and stable condition to home with close outpatient follow-up.      Discharge Date  7/21/2025    FOLLOW UP ITEMS POST DISCHARGE  PCP, Cardiologist, Urologist within the next few days    DISCHARGE DIAGNOSES  Active Problems:    Hydronephrosis (POA: Yes)    Atrial fibrillation (HCC) (POA: Yes)    Stage 3a chronic kidney disease (POA: Yes)    Elevated troponin (POA: Yes)    Acute renal failure (ARF) (HCC) (POA: Yes)    Normocytic anemia (POA: Yes)    Iron deficiency anemia (POA: Unknown)    Hypertension (POA: Yes)    Hyperlipidemia (POA: Yes)    Hypothyroid (POA: Yes)  Resolved Problems:    RBBB (POA: Yes)      Overview: IMO load March 2020    Metabolic acidosis (POA: Yes)    Sepsis secondary to UTI (HCC) (POA: Yes)    Hypocalcemia (POA: Yes)    Hypomagnesemia (POA: Yes)    Electrolyte abnormality (POA: Yes)      FOLLOW UP  No future appointments.  [facility]  [address] [place] [address]  [phone]    Follow up  [facility] will contact patient to arrange outpatient follow up.      MEDICATIONS ON DISCHARGE     Medication List        START taking these medications        Instructions   amiodarone 200 MG Tabs  Start taking on: July 22, 2025  Commonly known as: Cordarone   Take 1 Tablet by mouth every day.  Dose: 200 mg     apixaban 2.5 MG Tabs  Commonly known as: Eliquis   Take 1 Tablet by mouth 2 times a day.  Dose: 2.5 mg     ferrous sulfate 325 (65  "Fe) MG tablet  Start taking on: July 22, 2025   Take 1 Tablet by mouth every morning with breakfast.  Dose: 325 mg     sodium bicarbonate 650 MG Tabs  Commonly known as: Sodium Bicarbonate   Take 1 Tablet by mouth in the morning, at noon, and at bedtime for 3 days.  Dose: 650 mg     tamsulosin 0.4 MG capsule  Commonly known as: Flomax   Take 1 Capsule by mouth 1/2 hour after dinner.  Dose: 0.4 mg            CONTINUE taking these medications        Instructions   aspirin EC 81 MG Tbec  Commonly known as: Ecotrin   Take 81 mg by mouth every day.  Dose: 81 mg     atorvastatin 10 MG Tabs  Commonly known as: Lipitor   Take 10 mg by mouth every evening.  Dose: 10 mg     CENTRUM SILVER PO   Take  by mouth. \"150 multivitamin\"     levothyroxine 112 MCG Tabs  Commonly known as: Synthroid   Take 112 mcg by mouth every morning on an empty stomach.  Dose: 112 mcg     vitamin D3 25 MCG (1000 UT) Tabs  Commonly known as: Cholecalciferol   Take 5,000 Units by mouth every day.  Dose: 5,000 Units            STOP taking these medications      amLODIPine 5 MG Tabs  Commonly known as: Norvasc     losartan 100 MG Tabs  Commonly known as: Cozaar              Allergies  Allergies[1]    DIET  Orders Placed This Encounter   Procedures    Diet Order Diet: Regular     Standing Status:   Standing     Number of Occurrences:   1     Diet::   Regular [1]       ACTIVITY  As tolerated.      CONSULTATIONS  Urology  Critical Care    PROCEDURES  N/A    LABORATORY  Lab Results   Component Value Date    SODIUM 141 07/21/2025    POTASSIUM 3.4 (L) 07/21/2025    CHLORIDE 112 07/21/2025    CO2 17 (L) 07/21/2025    GLUCOSE 112 (H) 07/21/2025    BUN 46 (H) 07/21/2025    CREATININE 2.16 (H) 07/21/2025        Lab Results   Component Value Date    WBC 8.2 07/21/2025    HEMOGLOBIN 9.5 (L) 07/21/2025    HEMATOCRIT 28.4 (L) 07/21/2025    PLATELETCT 252 07/21/2025        Total time of the discharge process exceeds 36 minutes.         [1] No Known Allergies    "

## 2025-07-21 NOTE — FACE TO FACE
Face to Face Supporting Documentation - Home Health    The encounter with this patient was in whole or in part the primary reason for home health admission.    Date of encounter:   Patient:                    MRN:                       YOB: 2025  Thomas Slater  [id number]  1945     Home health to see patient for:  Skilled Nursing care for assessment, interventions & education, Physical Therapy evaluation and treatment, and Occupational therapy evaluation and treatment    Skilled need for:  Exacerbation of Chronic Disease State YADI, hydronephrosis, Urine retention    Skilled nursing interventions to include:  Line/Drain/Airway education and care    Homebound status evidenced by:  Need the aid of supportive devices such as crutches, canes, wheelchairs or walkers. Leaving home requires a considerable and taxing effort. There is a normal inability to leave the home.    Community Physician to provide follow up care: Grzegorz STEPHENSON M.D.     Optional Interventions? No      I certify the face to face encounter for this home health care referral meets the CMS requirements and the encounter/clinical assessment with the patient was, in whole, or in part, for the medical condition(s) listed above, which is the primary reason for home health care. Based on my clinical findings: the service(s) are medically necessary, support the need for home health care, and the homebound criteria are met.  I certify that this patient has had a face to face encounter by myself.  Mirna Chen M.D. - NPI: [id number]

## 2025-07-21 NOTE — PROGRESS NOTES
Patient received discharge paperwork and education, patient expressed understanding of discharge instructions and signed paperwork. Patient's PIVs and telebox were removed from patient. Patient had all belongings with them upon discharge. Patient transferred off unit via a wheelchair and transport team to family's vehicle.     Patient's room had all IV fluids and linens removed. Room ready for EVS.

## 2025-07-21 NOTE — DOCUMENTATION QUERY
[facility]                                                                       Query Response Note      PATIENT:               ÓSCAR ALMANZA  ACCT #:                  [id number]  MRN:                     [id number]  :                      1945  ADMIT DATE:       7/15/2025 5:50 PM  DISCH DATE:        2025 1:28 PM  RESPONDING  PROVIDER #:        [id number]           QUERY TEXT:    The diagnosis of sepsis has been documented in the Medical Record with 3 sirs criteria and organ dysfunction of valentin.     Please clarify the status of sepsis after further workup can identify infectious source.     Sepsis - real or suspected infection plus 2 or more SIRS criteria + organ dysfunction related to sepsis    Temp <96.8 or >101  HR >90  RR >20  WBC <4,000 or > 12,000 or >10% bandemia     Examples of organ dysfunction:    - acute renal failure creatinine > 2  - acute respiratory failure  - Total bilirubin >2  - Platelet count <100,000  - INR >1.5 or aPTT >60sec  - encephalopathy/altered mental status   - Lactate >2  - SBP <90 MAP <65 or SBP decrease of more than 40mmhg  - septic shock= sepsis + MAP <65 or LA > 2 after fluid resuscitation and/or lactic acid >4    The patient's Clinical Indicators include:  Findings: 7/15 HP: abnormal lab results, metabolic acidosis and severe hypocalcemia in the  setting of acute UTI, left sided hydronephrosis and ongoing acute on chronic diarrhea Sepsis Present on admission  Source is Acute UTI     PN: Acute renal failure Dehydration vs secondary to left renal stone or both urinalysis concerning for infection      Urology Consult: Denies  any voiding difficulty or UTI symptoms.      PN: sent by primary care provider due to abnormal labs metabolic acidosis On admission, CO2 was 9, anion gap was 22.  No significant improvement.t developed A-fib with RVR     Urology PN: admitted for metabolic acidosis,  electrolyte abnormalities, ARF, sepsis - urology consulted when patient found to have left hydronephrosis Creatinine initially improved this week, though now increasing >4.0 with baseline ~1.26    MAR 07/16-07/17 Zosyn    07/15 Labs WBC 18.3 Creatinine 2.60  Lactic acid 0.9    07/15 Vitals: 98.3- /58- -Respirations 24     Treatment: Labs IV fluids, IV antibiotics, ICU    Risk Factors: hydronephrosis    Nathalie Lopes RN BSN  [place],  Clinical Documentation   [email or web address]  Connect via Rayn    Note: If you agree with a diagnosis listed above, please remember to include it in your concurrent daily documentation and onto the Discharge Summary.  Options provided:   -- Sepsis does not exist - amended documentation in the medical record and updated problem list   -- Sepsis exists, (please specify if uti is ruled in as infectious source)   -- Other explanation, Please specify      Query created by: Nathalie Lopes on 7/21/2025 3:23 AM    RESPONSE TEXT:    Sepsis does not exist - amended documentation in the medical record and updated problem list          Electronically signed by:  SAMIR TOVAR MD 7/21/2025 4:15 PM

## 2025-07-21 NOTE — CARE PLAN
The patient is Stable - Low risk of patient condition declining or worsening    Shift Goals  Clinical Goals: Monitor labs and VS  Patient Goals: POC updates, sleep  Family Goals: ALLIE    Progress made toward(s) clinical / shift goals: Pt is AO x4. Pt no complaints of pain during this shift. Pt educated on POC and safety. VSS. Will continue to monitor labs. Pt bed is locked, in lowest position, and water pitcher refilled.      Problem: Fall Risk  Goal: Patient will remain free from falls  Outcome: Progressing     Problem: Fluid Volume  Goal: Fluid volume balance will be maintained  Outcome: Progressing       Patient is not progressing towards the following goals:

## 2025-07-21 NOTE — PROGRESS NOTES
Telemetry Shift Summary     Rhythm: SA  HR Range:  Ectopy: RPVC, RTRIG, RBIG  Measurements: 0.17/0.10/0.40    Normal Values  Measurements: 0.12- 0.20 / 0.08-0.10 / 0.30-0.52

## 2025-07-21 NOTE — DISCHARGE PLANNING
"Case Management Discharge Planning    Admission Date: 7/15/2025  GMLOS: 3.5  ALOS: 6    6-Clicks ADL Score: 23  6-Clicks Mobility Score: 22      Anticipated Discharge Dispo: Discharge Disposition: Discharged to home/self care (01)  Discharge Address: [address]   [place] [address]    : LMSW attempted to get HHC choice pt had been discharged. This writer called pt phone VM could no be left due to message stating \" this number is not accepting calls at this time.\"     "

## 2025-08-04 ENCOUNTER — HOSPITAL ENCOUNTER (OUTPATIENT)
Facility: MEDICAL CENTER | Age: 80
End: 2025-08-04
Attending: FAMILY MEDICINE
Payer: COMMERCIAL

## 2025-08-04 ENCOUNTER — HOSPITAL ENCOUNTER (OUTPATIENT)
Facility: MEDICAL CENTER | Age: 80
End: 2025-08-04
Payer: COMMERCIAL

## 2025-08-04 LAB
ALBUMIN SERPL BCP-MCNC: 3.3 G/DL (ref 3.2–4.9)
ALBUMIN/GLOB SERPL: 1.3 G/DL
ALP SERPL-CCNC: 92 U/L (ref 30–99)
ALT SERPL-CCNC: 29 U/L (ref 2–50)
ANION GAP SERPL CALC-SCNC: 10 MMOL/L (ref 7–16)
ANION GAP SERPL CALC-SCNC: 13 MMOL/L (ref 7–16)
AST SERPL-CCNC: 27 U/L (ref 12–45)
BASOPHILS # BLD AUTO: 0.6 % (ref 0–1.8)
BASOPHILS # BLD: 0.03 K/UL (ref 0–0.12)
BILIRUB SERPL-MCNC: 0.3 MG/DL (ref 0.1–1.5)
BUN SERPL-MCNC: 19 MG/DL (ref 8–22)
BUN SERPL-MCNC: 19 MG/DL (ref 8–22)
CALCIUM ALBUM COR SERPL-MCNC: 8.5 MG/DL (ref 8.5–10.5)
CALCIUM SERPL-MCNC: 7.8 MG/DL (ref 8.5–10.5)
CALCIUM SERPL-MCNC: 7.9 MG/DL (ref 8.5–10.5)
CHLORIDE SERPL-SCNC: 114 MMOL/L (ref 96–112)
CHLORIDE SERPL-SCNC: 116 MMOL/L (ref 96–112)
CO2 SERPL-SCNC: 18 MMOL/L (ref 20–33)
CO2 SERPL-SCNC: 18 MMOL/L (ref 20–33)
CREAT SERPL-MCNC: 1.52 MG/DL (ref 0.5–1.4)
CREAT SERPL-MCNC: 1.52 MG/DL (ref 0.5–1.4)
EOSINOPHIL # BLD AUTO: 0.32 K/UL (ref 0–0.51)
EOSINOPHIL NFR BLD: 6.2 % (ref 0–6.9)
ERYTHROCYTE [DISTWIDTH] IN BLOOD BY AUTOMATED COUNT: 49.3 FL (ref 35.9–50)
FASTING STATUS PATIENT QL REPORTED: NORMAL
FASTING STATUS PATIENT QL REPORTED: NORMAL
GFR SERPLBLD CREATININE-BSD FMLA CKD-EPI: 46 ML/MIN/1.73 M 2
GFR SERPLBLD CREATININE-BSD FMLA CKD-EPI: 46 ML/MIN/1.73 M 2
GLOBULIN SER CALC-MCNC: 2.5 G/DL (ref 1.9–3.5)
GLUCOSE SERPL-MCNC: 90 MG/DL (ref 65–99)
GLUCOSE SERPL-MCNC: 92 MG/DL (ref 65–99)
HCT VFR BLD AUTO: 32.2 % (ref 42–52)
HGB BLD-MCNC: 9.9 G/DL (ref 14–18)
IMM GRANULOCYTES # BLD AUTO: 0.02 K/UL (ref 0–0.11)
IMM GRANULOCYTES NFR BLD AUTO: 0.4 % (ref 0–0.9)
LYMPHOCYTES # BLD AUTO: 1.49 K/UL (ref 1–4.8)
LYMPHOCYTES NFR BLD: 28.7 % (ref 22–41)
MCH RBC QN AUTO: 30.7 PG (ref 27–33)
MCHC RBC AUTO-ENTMCNC: 30.7 G/DL (ref 32.3–36.5)
MCV RBC AUTO: 99.7 FL (ref 81.4–97.8)
MONOCYTES # BLD AUTO: 0.56 K/UL (ref 0–0.85)
MONOCYTES NFR BLD AUTO: 10.8 % (ref 0–13.4)
NEUTROPHILS # BLD AUTO: 2.78 K/UL (ref 1.82–7.42)
NEUTROPHILS NFR BLD: 53.3 % (ref 44–72)
NRBC # BLD AUTO: 0 K/UL
NRBC BLD-RTO: 0 /100 WBC (ref 0–0.2)
PLATELET # BLD AUTO: 278 K/UL (ref 164–446)
PMV BLD AUTO: 11.3 FL (ref 9–12.9)
POTASSIUM SERPL-SCNC: 3.6 MMOL/L (ref 3.6–5.5)
POTASSIUM SERPL-SCNC: 3.7 MMOL/L (ref 3.6–5.5)
PROT SERPL-MCNC: 5.8 G/DL (ref 6–8.2)
PTH-INTACT SERPL-MCNC: 72.9 PG/ML (ref 14–72)
RBC # BLD AUTO: 3.23 M/UL (ref 4.7–6.1)
SODIUM SERPL-SCNC: 144 MMOL/L (ref 135–145)
SODIUM SERPL-SCNC: 145 MMOL/L (ref 135–145)
WBC # BLD AUTO: 5.2 K/UL (ref 4.8–10.8)

## 2025-08-04 PROCEDURE — 36415 COLL VENOUS BLD VENIPUNCTURE: CPT

## 2025-08-04 PROCEDURE — 83970 ASSAY OF PARATHORMONE: CPT

## 2025-08-04 PROCEDURE — 85025 COMPLETE CBC W/AUTO DIFF WBC: CPT

## 2025-08-04 PROCEDURE — 80053 COMPREHEN METABOLIC PANEL: CPT

## 2025-08-04 PROCEDURE — 80048 BASIC METABOLIC PNL TOTAL CA: CPT

## 2025-08-08 ENCOUNTER — HOSPITAL ENCOUNTER (OUTPATIENT)
Dept: RADIOLOGY | Facility: MEDICAL CENTER | Age: 80
End: 2025-08-08
Payer: COMMERCIAL

## 2025-08-08 DIAGNOSIS — N13.30 HYDRONEPHROSIS, UNSPECIFIED HYDRONEPHROSIS TYPE: ICD-10-CM

## 2025-08-08 PROCEDURE — 78708 K FLOW/FUNCT IMAGE W/DRUG: CPT

## 2025-08-08 RX ORDER — FUROSEMIDE 10 MG/ML
INJECTION INTRAMUSCULAR; INTRAVENOUS
Status: DISCONTINUED
Start: 2025-08-08 | End: 2025-08-09 | Stop reason: HOSPADM

## 2025-08-09 ENCOUNTER — HOSPITAL ENCOUNTER (OUTPATIENT)
Facility: MEDICAL CENTER | Age: 80
End: 2025-08-09
Attending: NURSE PRACTITIONER
Payer: COMMERCIAL

## 2025-08-09 PROCEDURE — 82272 OCCULT BLD FECES 1-3 TESTS: CPT

## 2025-08-10 ENCOUNTER — HOSPITAL ENCOUNTER (OUTPATIENT)
Facility: MEDICAL CENTER | Age: 80
End: 2025-08-10
Attending: NURSE PRACTITIONER
Payer: COMMERCIAL

## 2025-08-10 PROCEDURE — 82272 OCCULT BLD FECES 1-3 TESTS: CPT

## 2025-08-11 ENCOUNTER — HOSPITAL ENCOUNTER (OUTPATIENT)
Facility: MEDICAL CENTER | Age: 80
End: 2025-08-11
Attending: NURSE PRACTITIONER
Payer: COMMERCIAL

## 2025-08-11 LAB
ALBUMIN SERPL BCP-MCNC: 3.1 G/DL (ref 3.2–4.9)
ALBUMIN/GLOB SERPL: 1.2 G/DL
ALP SERPL-CCNC: 83 U/L (ref 30–99)
ALT SERPL-CCNC: 24 U/L (ref 2–50)
ANION GAP SERPL CALC-SCNC: 12 MMOL/L (ref 7–16)
AST SERPL-CCNC: 25 U/L (ref 12–45)
BASOPHILS # BLD AUTO: 0.7 % (ref 0–1.8)
BASOPHILS # BLD: 0.04 K/UL (ref 0–0.12)
BILIRUB SERPL-MCNC: 0.2 MG/DL (ref 0.1–1.5)
BUN SERPL-MCNC: 27 MG/DL (ref 8–22)
CALCIUM ALBUM COR SERPL-MCNC: 7.6 MG/DL (ref 8.5–10.5)
CALCIUM SERPL-MCNC: 6.9 MG/DL (ref 8.5–10.5)
CHLORIDE SERPL-SCNC: 117 MMOL/L (ref 96–112)
CO2 SERPL-SCNC: 17 MMOL/L (ref 20–33)
CREAT SERPL-MCNC: 1.62 MG/DL (ref 0.5–1.4)
EOSINOPHIL # BLD AUTO: 0.33 K/UL (ref 0–0.51)
EOSINOPHIL NFR BLD: 5.5 % (ref 0–6.9)
ERYTHROCYTE [DISTWIDTH] IN BLOOD BY AUTOMATED COUNT: 51.2 FL (ref 35.9–50)
GFR SERPLBLD CREATININE-BSD FMLA CKD-EPI: 43 ML/MIN/1.73 M 2
GLOBULIN SER CALC-MCNC: 2.6 G/DL (ref 1.9–3.5)
GLUCOSE SERPL-MCNC: 93 MG/DL (ref 65–99)
HCT VFR BLD AUTO: 32.4 % (ref 42–52)
HEMOCCULT STL QL: NEGATIVE
HEMOCCULT STL QL: NEGATIVE
HEMOCCULT STL QL: POSITIVE
HGB BLD-MCNC: 10.1 G/DL (ref 14–18)
IMM GRANULOCYTES # BLD AUTO: 0.03 K/UL (ref 0–0.11)
IMM GRANULOCYTES NFR BLD AUTO: 0.5 % (ref 0–0.9)
LYMPHOCYTES # BLD AUTO: 1.57 K/UL (ref 1–4.8)
LYMPHOCYTES NFR BLD: 26 % (ref 22–41)
MCH RBC QN AUTO: 31.1 PG (ref 27–33)
MCHC RBC AUTO-ENTMCNC: 31.2 G/DL (ref 32.3–36.5)
MCV RBC AUTO: 99.7 FL (ref 81.4–97.8)
MONOCYTES # BLD AUTO: 0.54 K/UL (ref 0–0.85)
MONOCYTES NFR BLD AUTO: 9 % (ref 0–13.4)
NEUTROPHILS # BLD AUTO: 3.52 K/UL (ref 1.82–7.42)
NEUTROPHILS NFR BLD: 58.3 % (ref 44–72)
NRBC # BLD AUTO: 0 K/UL
NRBC BLD-RTO: 0 /100 WBC (ref 0–0.2)
NT-PROBNP SERPL IA-MCNC: 302 PG/ML (ref 0–125)
PLATELET # BLD AUTO: 176 K/UL (ref 164–446)
PMV BLD AUTO: 11.7 FL (ref 9–12.9)
POTASSIUM SERPL-SCNC: 3.5 MMOL/L (ref 3.6–5.5)
PROT SERPL-MCNC: 5.7 G/DL (ref 6–8.2)
RBC # BLD AUTO: 3.25 M/UL (ref 4.7–6.1)
SODIUM SERPL-SCNC: 146 MMOL/L (ref 135–145)
WBC # BLD AUTO: 6 K/UL (ref 4.8–10.8)

## 2025-08-11 PROCEDURE — 82438 ASSAY OTHER FLUID CHLORIDES: CPT

## 2025-08-11 PROCEDURE — 83630 LACTOFERRIN FECAL (QUAL): CPT

## 2025-08-11 PROCEDURE — 83986 ASSAY PH BODY FLUID NOS: CPT

## 2025-08-11 PROCEDURE — 80053 COMPREHEN METABOLIC PANEL: CPT

## 2025-08-11 PROCEDURE — 84999 UNLISTED CHEMISTRY PROCEDURE: CPT

## 2025-08-11 PROCEDURE — 85025 COMPLETE CBC W/AUTO DIFF WBC: CPT

## 2025-08-11 PROCEDURE — 83993 ASSAY FOR CALPROTECTIN FECAL: CPT

## 2025-08-11 PROCEDURE — 36415 COLL VENOUS BLD VENIPUNCTURE: CPT

## 2025-08-11 PROCEDURE — 84302 ASSAY OF SWEAT SODIUM: CPT

## 2025-08-11 PROCEDURE — 83880 ASSAY OF NATRIURETIC PEPTIDE: CPT

## 2025-08-11 PROCEDURE — 82272 OCCULT BLD FECES 1-3 TESTS: CPT

## 2025-08-12 ENCOUNTER — HOSPITAL ENCOUNTER (OUTPATIENT)
Facility: MEDICAL CENTER | Age: 80
End: 2025-08-13
Attending: STUDENT IN AN ORGANIZED HEALTH CARE EDUCATION/TRAINING PROGRAM | Admitting: STUDENT IN AN ORGANIZED HEALTH CARE EDUCATION/TRAINING PROGRAM
Payer: COMMERCIAL

## 2025-08-12 ENCOUNTER — HOSPITAL ENCOUNTER (OUTPATIENT)
Facility: MEDICAL CENTER | Age: 80
End: 2025-08-12
Attending: NURSE PRACTITIONER
Payer: COMMERCIAL

## 2025-08-12 DIAGNOSIS — E83.51 HYPOCALCEMIA: ICD-10-CM

## 2025-08-12 DIAGNOSIS — E83.42 HYPOMAGNESEMIA: Primary | ICD-10-CM

## 2025-08-12 DIAGNOSIS — E87.6 HYPOKALEMIA: ICD-10-CM

## 2025-08-12 PROBLEM — N40.1 BENIGN PROSTATIC HYPERPLASIA WITH URINARY OBSTRUCTION: Status: ACTIVE | Noted: 2025-08-12

## 2025-08-12 PROBLEM — K52.9 CHRONIC DIARRHEA: Status: ACTIVE | Noted: 2025-08-12

## 2025-08-12 PROBLEM — I48.0 PAROXYSMAL ATRIAL FIBRILLATION (HCC): Status: ACTIVE | Noted: 2025-07-16

## 2025-08-12 PROBLEM — N13.8 BENIGN PROSTATIC HYPERPLASIA WITH URINARY OBSTRUCTION: Status: ACTIVE | Noted: 2025-08-12

## 2025-08-12 LAB
ALBUMIN SERPL BCP-MCNC: 3.3 G/DL (ref 3.2–4.9)
ALBUMIN/GLOB SERPL: 1.2 G/DL
ALP SERPL-CCNC: 89 U/L (ref 30–99)
ALT SERPL-CCNC: 24 U/L (ref 2–50)
ANION GAP SERPL CALC-SCNC: 12 MMOL/L (ref 7–16)
AST SERPL-CCNC: 26 U/L (ref 12–45)
BASOPHILS # BLD AUTO: 0.5 % (ref 0–1.8)
BASOPHILS # BLD: 0.03 K/UL (ref 0–0.12)
BILIRUB SERPL-MCNC: <0.2 MG/DL (ref 0.1–1.5)
BUN SERPL-MCNC: 30 MG/DL (ref 8–22)
CA-I SERPL-SCNC: 1 MMOL/L (ref 1.1–1.3)
CALCIUM ALBUM COR SERPL-MCNC: 7.7 MG/DL (ref 8.5–10.5)
CALCIUM SERPL-MCNC: 7.1 MG/DL (ref 8.5–10.5)
CHLORIDE SERPL-SCNC: 117 MMOL/L (ref 96–112)
CO2 SERPL-SCNC: 16 MMOL/L (ref 20–33)
CORTIS SERPL-MCNC: 5 UG/DL (ref 0–23)
CREAT SERPL-MCNC: 1.55 MG/DL (ref 0.5–1.4)
EKG IMPRESSION: NORMAL
EOSINOPHIL # BLD AUTO: 0.32 K/UL (ref 0–0.51)
EOSINOPHIL NFR BLD: 5.4 % (ref 0–6.9)
ERYTHROCYTE [DISTWIDTH] IN BLOOD BY AUTOMATED COUNT: 50.4 FL (ref 35.9–50)
GFR SERPLBLD CREATININE-BSD FMLA CKD-EPI: 45 ML/MIN/1.73 M 2
GLOBULIN SER CALC-MCNC: 2.7 G/DL (ref 1.9–3.5)
GLUCOSE SERPL-MCNC: 89 MG/DL (ref 65–99)
HCT VFR BLD AUTO: 31.8 % (ref 42–52)
HGB BLD-MCNC: 10.2 G/DL (ref 14–18)
IMM GRANULOCYTES # BLD AUTO: 0.02 K/UL (ref 0–0.11)
IMM GRANULOCYTES NFR BLD AUTO: 0.3 % (ref 0–0.9)
LYMPHOCYTES # BLD AUTO: 1.56 K/UL (ref 1–4.8)
LYMPHOCYTES NFR BLD: 26.3 % (ref 22–41)
MAGNESIUM SERPL-MCNC: 0.7 MG/DL (ref 1.5–2.5)
MAGNESIUM SERPL-MCNC: 0.7 MG/DL (ref 1.5–2.5)
MCH RBC QN AUTO: 31.7 PG (ref 27–33)
MCHC RBC AUTO-ENTMCNC: 32.1 G/DL (ref 32.3–36.5)
MCV RBC AUTO: 98.8 FL (ref 81.4–97.8)
MONOCYTES # BLD AUTO: 0.58 K/UL (ref 0–0.85)
MONOCYTES NFR BLD AUTO: 9.8 % (ref 0–13.4)
NEUTROPHILS # BLD AUTO: 3.43 K/UL (ref 1.82–7.42)
NEUTROPHILS NFR BLD: 57.7 % (ref 44–72)
NRBC # BLD AUTO: 0 K/UL
NRBC BLD-RTO: 0 /100 WBC (ref 0–0.2)
PHOSPHATE SERPL-MCNC: 3 MG/DL (ref 2.5–4.5)
PLATELET # BLD AUTO: 165 K/UL (ref 164–446)
PMV BLD AUTO: 11.3 FL (ref 9–12.9)
POTASSIUM SERPL-SCNC: 3.3 MMOL/L (ref 3.6–5.5)
PROT SERPL-MCNC: 6 G/DL (ref 6–8.2)
RBC # BLD AUTO: 3.22 M/UL (ref 4.7–6.1)
SODIUM SERPL-SCNC: 145 MMOL/L (ref 135–145)
T4 FREE SERPL-MCNC: 0.92 NG/DL (ref 0.93–1.7)
TSH SERPL DL<=0.005 MIU/L-ACNC: 12.7 UIU/ML (ref 0.38–5.33)
WBC # BLD AUTO: 5.9 K/UL (ref 4.8–10.8)

## 2025-08-12 PROCEDURE — 80053 COMPREHEN METABOLIC PANEL: CPT

## 2025-08-12 PROCEDURE — 700111 HCHG RX REV CODE 636 W/ 250 OVERRIDE (IP): Performed by: STUDENT IN AN ORGANIZED HEALTH CARE EDUCATION/TRAINING PROGRAM

## 2025-08-12 PROCEDURE — 700102 HCHG RX REV CODE 250 W/ 637 OVERRIDE(OP): Performed by: STUDENT IN AN ORGANIZED HEALTH CARE EDUCATION/TRAINING PROGRAM

## 2025-08-12 PROCEDURE — 84439 ASSAY OF FREE THYROXINE: CPT

## 2025-08-12 PROCEDURE — 85025 COMPLETE CBC W/AUTO DIFF WBC: CPT

## 2025-08-12 PROCEDURE — G0378 HOSPITAL OBSERVATION PER HR: HCPCS

## 2025-08-12 PROCEDURE — A9270 NON-COVERED ITEM OR SERVICE: HCPCS | Performed by: STUDENT IN AN ORGANIZED HEALTH CARE EDUCATION/TRAINING PROGRAM

## 2025-08-12 PROCEDURE — 99223 1ST HOSP IP/OBS HIGH 75: CPT | Performed by: STUDENT IN AN ORGANIZED HEALTH CARE EDUCATION/TRAINING PROGRAM

## 2025-08-12 PROCEDURE — 700105 HCHG RX REV CODE 258: Performed by: STUDENT IN AN ORGANIZED HEALTH CARE EDUCATION/TRAINING PROGRAM

## 2025-08-12 PROCEDURE — 99285 EMERGENCY DEPT VISIT HI MDM: CPT

## 2025-08-12 PROCEDURE — 84443 ASSAY THYROID STIM HORMONE: CPT

## 2025-08-12 PROCEDURE — 83735 ASSAY OF MAGNESIUM: CPT

## 2025-08-12 PROCEDURE — 82330 ASSAY OF CALCIUM: CPT

## 2025-08-12 PROCEDURE — 93005 ELECTROCARDIOGRAM TRACING: CPT | Mod: TC

## 2025-08-12 PROCEDURE — 93005 ELECTROCARDIOGRAM TRACING: CPT | Mod: TC | Performed by: STUDENT IN AN ORGANIZED HEALTH CARE EDUCATION/TRAINING PROGRAM

## 2025-08-12 PROCEDURE — 36415 COLL VENOUS BLD VENIPUNCTURE: CPT

## 2025-08-12 PROCEDURE — 84100 ASSAY OF PHOSPHORUS: CPT

## 2025-08-12 PROCEDURE — 96365 THER/PROPH/DIAG IV INF INIT: CPT

## 2025-08-12 PROCEDURE — 83735 ASSAY OF MAGNESIUM: CPT | Mod: 91

## 2025-08-12 PROCEDURE — 82533 TOTAL CORTISOL: CPT

## 2025-08-12 PROCEDURE — 700111 HCHG RX REV CODE 636 W/ 250 OVERRIDE (IP): Mod: JZ | Performed by: STUDENT IN AN ORGANIZED HEALTH CARE EDUCATION/TRAINING PROGRAM

## 2025-08-12 PROCEDURE — 96368 THER/DIAG CONCURRENT INF: CPT

## 2025-08-12 PROCEDURE — 96366 THER/PROPH/DIAG IV INF ADDON: CPT

## 2025-08-12 RX ORDER — AMIODARONE HYDROCHLORIDE 200 MG/1
200 TABLET ORAL DAILY
Status: DISCONTINUED | OUTPATIENT
Start: 2025-08-13 | End: 2025-08-13 | Stop reason: HOSPADM

## 2025-08-12 RX ORDER — LOPERAMIDE HYDROCHLORIDE 2 MG/1
2 CAPSULE ORAL 4 TIMES DAILY PRN
Status: DISCONTINUED | OUTPATIENT
Start: 2025-08-12 | End: 2025-08-13 | Stop reason: HOSPADM

## 2025-08-12 RX ORDER — MAGNESIUM SULFATE HEPTAHYDRATE 40 MG/ML
2 INJECTION, SOLUTION INTRAVENOUS ONCE
Status: COMPLETED | OUTPATIENT
Start: 2025-08-12 | End: 2025-08-12

## 2025-08-12 RX ORDER — LANOLIN ALCOHOL/MO/W.PET/CERES
400 CREAM (GRAM) TOPICAL DAILY
Status: DISCONTINUED | OUTPATIENT
Start: 2025-08-13 | End: 2025-08-13 | Stop reason: HOSPADM

## 2025-08-12 RX ORDER — FERROUS SULFATE 325(65) MG
325 TABLET ORAL
Status: DISCONTINUED | OUTPATIENT
Start: 2025-08-13 | End: 2025-08-12

## 2025-08-12 RX ORDER — ACETAMINOPHEN 500 MG
1000 TABLET ORAL EVERY 6 HOURS PRN
Status: DISCONTINUED | OUTPATIENT
Start: 2025-08-12 | End: 2025-08-13 | Stop reason: HOSPADM

## 2025-08-12 RX ORDER — POTASSIUM CHLORIDE 1500 MG/1
40 TABLET, EXTENDED RELEASE ORAL ONCE
Status: COMPLETED | OUTPATIENT
Start: 2025-08-12 | End: 2025-08-12

## 2025-08-12 RX ORDER — ONDANSETRON 2 MG/ML
4 INJECTION INTRAMUSCULAR; INTRAVENOUS EVERY 4 HOURS PRN
Status: DISCONTINUED | OUTPATIENT
Start: 2025-08-12 | End: 2025-08-13 | Stop reason: HOSPADM

## 2025-08-12 RX ORDER — SODIUM CHLORIDE, SODIUM LACTATE, POTASSIUM CHLORIDE, AND CALCIUM CHLORIDE .6; .31; .03; .02 G/100ML; G/100ML; G/100ML; G/100ML
1000 INJECTION, SOLUTION INTRAVENOUS ONCE
Status: COMPLETED | OUTPATIENT
Start: 2025-08-12 | End: 2025-08-12

## 2025-08-12 RX ORDER — MAGNESIUM SULFATE HEPTAHYDRATE 40 MG/ML
4 INJECTION, SOLUTION INTRAVENOUS ONCE
Status: COMPLETED | OUTPATIENT
Start: 2025-08-13 | End: 2025-08-13

## 2025-08-12 RX ORDER — VITAMIN B COMPLEX
5000 TABLET ORAL DAILY
Status: DISCONTINUED | OUTPATIENT
Start: 2025-08-13 | End: 2025-08-13 | Stop reason: HOSPADM

## 2025-08-12 RX ORDER — TAMSULOSIN HYDROCHLORIDE 0.4 MG/1
0.4 CAPSULE ORAL
Status: DISCONTINUED | OUTPATIENT
Start: 2025-08-13 | End: 2025-08-13 | Stop reason: HOSPADM

## 2025-08-12 RX ORDER — LEVOTHYROXINE SODIUM 112 UG/1
112 TABLET ORAL
Status: DISCONTINUED | OUTPATIENT
Start: 2025-08-13 | End: 2025-08-13 | Stop reason: HOSPADM

## 2025-08-12 RX ORDER — SODIUM BICARBONATE 650 MG/1
1300 TABLET ORAL
Status: DISCONTINUED | OUTPATIENT
Start: 2025-08-12 | End: 2025-08-13 | Stop reason: HOSPADM

## 2025-08-12 RX ORDER — ENOXAPARIN SODIUM 100 MG/ML
40 INJECTION SUBCUTANEOUS DAILY
Status: DISCONTINUED | OUTPATIENT
Start: 2025-08-13 | End: 2025-08-12

## 2025-08-12 RX ORDER — CALCIUM GLUCONATE 20 MG/ML
2 INJECTION, SOLUTION INTRAVENOUS ONCE
Status: COMPLETED | OUTPATIENT
Start: 2025-08-12 | End: 2025-08-12

## 2025-08-12 RX ORDER — ONDANSETRON 4 MG/1
4 TABLET, ORALLY DISINTEGRATING ORAL EVERY 4 HOURS PRN
Status: DISCONTINUED | OUTPATIENT
Start: 2025-08-12 | End: 2025-08-13 | Stop reason: HOSPADM

## 2025-08-12 RX ORDER — ATORVASTATIN CALCIUM 10 MG/1
10 TABLET, FILM COATED ORAL NIGHTLY
Status: DISCONTINUED | OUTPATIENT
Start: 2025-08-13 | End: 2025-08-13 | Stop reason: HOSPADM

## 2025-08-12 RX ADMIN — SODIUM CHLORIDE, POTASSIUM CHLORIDE, SODIUM LACTATE AND CALCIUM CHLORIDE 1000 ML: 600; 310; 30; 20 INJECTION, SOLUTION INTRAVENOUS at 20:37

## 2025-08-12 RX ADMIN — SODIUM BICARBONATE 1300 MG: 650 TABLET ORAL at 23:38

## 2025-08-12 RX ADMIN — APIXABAN 2.5 MG: 2.5 TABLET, FILM COATED ORAL at 23:39

## 2025-08-12 RX ADMIN — MAGNESIUM SULFATE HEPTAHYDRATE 2 G: 2 INJECTION, SOLUTION INTRAVENOUS at 20:40

## 2025-08-12 RX ADMIN — CALCIUM GLUCONATE 2 G: 20 INJECTION, SOLUTION INTRAVENOUS at 20:42

## 2025-08-12 RX ADMIN — MAGNESIUM SULFATE IN WATER FOR 4 G: 40 INJECTION INTRAVENOUS at 23:43

## 2025-08-12 RX ADMIN — POTASSIUM CHLORIDE 40 MEQ: 1500 TABLET, EXTENDED RELEASE ORAL at 21:08

## 2025-08-12 ASSESSMENT — CHA2DS2 SCORE
HYPERTENSION: NO
CHA2DS2 VASC SCORE: 2
VASCULAR DISEASE: NO
PRIOR STROKE OR TIA OR THROMBOEMBOLISM: NO
CHF OR LEFT VENTRICULAR DYSFUNCTION: NO
DIABETES: NO
SEX: MALE
AGE 65 TO 74: NO
AGE 75 OR GREATER: YES

## 2025-08-12 ASSESSMENT — ENCOUNTER SYMPTOMS
SHORTNESS OF BREATH: 0
BACK PAIN: 0
HEADACHES: 0
LOSS OF CONSCIOUSNESS: 0
FEVER: 0
PALPITATIONS: 0
DIZZINESS: 0
SINUS PAIN: 0
FLANK PAIN: 0
COUGH: 1
CHILLS: 0
NECK PAIN: 0
SPUTUM PRODUCTION: 0
VOMITING: 0
ABDOMINAL PAIN: 0
NERVOUS/ANXIOUS: 0
DEPRESSION: 0
ORTHOPNEA: 0
MYALGIAS: 0
BRUISES/BLEEDS EASILY: 0
EYE PAIN: 0
SORE THROAT: 0
DIARRHEA: 1
NAUSEA: 0

## 2025-08-12 ASSESSMENT — FIBROSIS 4 INDEX: FIB4 SCORE: 2.32

## 2025-08-13 VITALS
BODY MASS INDEX: 25.79 KG/M2 | WEIGHT: 170.19 LBS | RESPIRATION RATE: 17 BRPM | HEART RATE: 64 BPM | SYSTOLIC BLOOD PRESSURE: 133 MMHG | DIASTOLIC BLOOD PRESSURE: 60 MMHG | HEIGHT: 68 IN | TEMPERATURE: 97.6 F | OXYGEN SATURATION: 95 %

## 2025-08-13 PROBLEM — E87.20 NORMAL ANION GAP METABOLIC ACIDOSIS: Status: RESOLVED | Noted: 2025-07-15 | Resolved: 2025-08-13

## 2025-08-13 PROBLEM — E87.6 HYPOKALEMIA: Status: RESOLVED | Noted: 2025-08-12 | Resolved: 2025-08-13

## 2025-08-13 LAB
ANION GAP SERPL CALC-SCNC: 9 MMOL/L (ref 7–16)
ANION GAP SERPL CALC-SCNC: 9 MMOL/L (ref 7–16)
BUN SERPL-MCNC: 22 MG/DL (ref 8–22)
BUN SERPL-MCNC: 24 MG/DL (ref 8–22)
CALCIUM SERPL-MCNC: 7.4 MG/DL (ref 8.5–10.5)
CALCIUM SERPL-MCNC: 8 MG/DL (ref 8.5–10.5)
CHLORIDE SERPL-SCNC: 115 MMOL/L (ref 96–112)
CHLORIDE SERPL-SCNC: 119 MMOL/L (ref 96–112)
CHLORIDE STL-SCNC: NORMAL MMOL/L
CO2 SERPL-SCNC: 15 MMOL/L (ref 20–33)
CO2 SERPL-SCNC: 18 MMOL/L (ref 20–33)
CREAT SERPL-MCNC: 1.2 MG/DL (ref 0.5–1.4)
CREAT SERPL-MCNC: 1.25 MG/DL (ref 0.5–1.4)
GFR SERPLBLD CREATININE-BSD FMLA CKD-EPI: 58 ML/MIN/1.73 M 2
GFR SERPLBLD CREATININE-BSD FMLA CKD-EPI: 61 ML/MIN/1.73 M 2
GLUCOSE SERPL-MCNC: 89 MG/DL (ref 65–99)
GLUCOSE SERPL-MCNC: 91 MG/DL (ref 65–99)
MAGNESIUM SERPL-MCNC: 2.2 MG/DL (ref 1.5–2.5)
POTASSIUM SERPL-SCNC: 3.3 MMOL/L (ref 3.6–5.5)
POTASSIUM SERPL-SCNC: 4 MMOL/L (ref 3.6–5.5)
POTASSIUM STL-SCNC: NORMAL MMOL/L
SODIUM SERPL-SCNC: 142 MMOL/L (ref 135–145)
SODIUM SERPL-SCNC: 143 MMOL/L (ref 135–145)
SODIUM STL-SCNC: NORMAL MMOL/L

## 2025-08-13 PROCEDURE — A9270 NON-COVERED ITEM OR SERVICE: HCPCS | Performed by: STUDENT IN AN ORGANIZED HEALTH CARE EDUCATION/TRAINING PROGRAM

## 2025-08-13 PROCEDURE — 700102 HCHG RX REV CODE 250 W/ 637 OVERRIDE(OP): Performed by: STUDENT IN AN ORGANIZED HEALTH CARE EDUCATION/TRAINING PROGRAM

## 2025-08-13 PROCEDURE — G0378 HOSPITAL OBSERVATION PER HR: HCPCS

## 2025-08-13 PROCEDURE — 99239 HOSP IP/OBS DSCHRG MGMT >30: CPT | Performed by: INTERNAL MEDICINE

## 2025-08-13 PROCEDURE — 96367 TX/PROPH/DG ADDL SEQ IV INF: CPT

## 2025-08-13 PROCEDURE — 83735 ASSAY OF MAGNESIUM: CPT

## 2025-08-13 PROCEDURE — 36415 COLL VENOUS BLD VENIPUNCTURE: CPT

## 2025-08-13 PROCEDURE — 700111 HCHG RX REV CODE 636 W/ 250 OVERRIDE (IP): Performed by: INTERNAL MEDICINE

## 2025-08-13 PROCEDURE — 80048 BASIC METABOLIC PNL TOTAL CA: CPT | Mod: 91

## 2025-08-13 PROCEDURE — 96366 THER/PROPH/DIAG IV INF ADDON: CPT

## 2025-08-13 RX ORDER — SODIUM BICARBONATE 650 MG/1
650 TABLET ORAL 3 TIMES DAILY
COMMUNITY

## 2025-08-13 RX ORDER — IBUPROFEN 200 MG
400 TABLET ORAL EVERY 6 HOURS PRN
COMMUNITY

## 2025-08-13 RX ORDER — POTASSIUM CHLORIDE 7.45 MG/ML
10 INJECTION INTRAVENOUS
Status: COMPLETED | OUTPATIENT
Start: 2025-08-13 | End: 2025-08-13

## 2025-08-13 RX ADMIN — APIXABAN 2.5 MG: 2.5 TABLET, FILM COATED ORAL at 07:50

## 2025-08-13 RX ADMIN — POTASSIUM CHLORIDE 10 MEQ: 7.46 INJECTION, SOLUTION INTRAVENOUS at 11:21

## 2025-08-13 RX ADMIN — AMIODARONE HYDROCHLORIDE 200 MG: 200 TABLET ORAL at 07:50

## 2025-08-13 RX ADMIN — Medication 400 MG: at 07:50

## 2025-08-13 RX ADMIN — POTASSIUM CHLORIDE 10 MEQ: 7.46 INJECTION, SOLUTION INTRAVENOUS at 14:01

## 2025-08-13 RX ADMIN — SODIUM BICARBONATE 1300 MG: 650 TABLET ORAL at 10:50

## 2025-08-13 RX ADMIN — POTASSIUM CHLORIDE 10 MEQ: 7.46 INJECTION, SOLUTION INTRAVENOUS at 10:21

## 2025-08-13 RX ADMIN — LEVOTHYROXINE SODIUM 112 MCG: 0.11 TABLET ORAL at 07:50

## 2025-08-13 RX ADMIN — Medication 5000 UNITS: at 08:40

## 2025-08-13 RX ADMIN — SODIUM BICARBONATE 1300 MG: 650 TABLET ORAL at 07:50

## 2025-08-13 RX ADMIN — POTASSIUM CHLORIDE 10 MEQ: 7.46 INJECTION, SOLUTION INTRAVENOUS at 12:20

## 2025-08-13 ASSESSMENT — PAIN DESCRIPTION - PAIN TYPE: TYPE: ACUTE PAIN

## 2025-08-13 ASSESSMENT — FIBROSIS 4 INDEX: FIB4 SCORE: 2.57

## 2025-08-14 LAB — MISCELLANEOUS LAB RESULT MISCLAB: NORMAL

## 2025-08-15 LAB — CALPROTECTIN STL-MCNT: 110 UG/G

## 2025-08-16 LAB — MISCELLANEOUS LAB RESULT MISCLAB: ABNORMAL

## 2025-08-20 ENCOUNTER — HOSPITAL ENCOUNTER (OUTPATIENT)
Facility: MEDICAL CENTER | Age: 80
End: 2025-08-20
Attending: FAMILY MEDICINE
Payer: COMMERCIAL

## 2025-08-20 LAB
ALBUMIN SERPL BCP-MCNC: 3 G/DL (ref 3.2–4.9)
ALBUMIN/GLOB SERPL: 1.1 G/DL
ALP SERPL-CCNC: 141 U/L (ref 30–99)
ALT SERPL-CCNC: 40 U/L (ref 2–50)
ANION GAP SERPL CALC-SCNC: 10 MMOL/L (ref 7–16)
AST SERPL-CCNC: 30 U/L (ref 12–45)
BASOPHILS # BLD AUTO: 0.5 % (ref 0–1.8)
BASOPHILS # BLD: 0.04 K/UL (ref 0–0.12)
BILIRUB SERPL-MCNC: 0.3 MG/DL (ref 0.1–1.5)
BUN SERPL-MCNC: 32 MG/DL (ref 8–22)
CALCIUM ALBUM COR SERPL-MCNC: 9.6 MG/DL (ref 8.5–10.5)
CALCIUM SERPL-MCNC: 8.8 MG/DL (ref 8.5–10.5)
CHLORIDE SERPL-SCNC: 116 MMOL/L (ref 96–112)
CO2 SERPL-SCNC: 18 MMOL/L (ref 20–33)
CREAT SERPL-MCNC: 1.54 MG/DL (ref 0.5–1.4)
EOSINOPHIL # BLD AUTO: 0.18 K/UL (ref 0–0.51)
EOSINOPHIL NFR BLD: 2.3 % (ref 0–6.9)
ERYTHROCYTE [DISTWIDTH] IN BLOOD BY AUTOMATED COUNT: 50.4 FL (ref 35.9–50)
GFR SERPLBLD CREATININE-BSD FMLA CKD-EPI: 45 ML/MIN/1.73 M 2
GLOBULIN SER CALC-MCNC: 2.7 G/DL (ref 1.9–3.5)
GLUCOSE SERPL-MCNC: 84 MG/DL (ref 65–99)
HCT VFR BLD AUTO: 30.6 % (ref 42–52)
HGB BLD-MCNC: 9.5 G/DL (ref 14–18)
IMM GRANULOCYTES # BLD AUTO: 0.2 K/UL (ref 0–0.11)
IMM GRANULOCYTES NFR BLD AUTO: 2.6 % (ref 0–0.9)
LYMPHOCYTES # BLD AUTO: 1.78 K/UL (ref 1–4.8)
LYMPHOCYTES NFR BLD: 22.9 % (ref 22–41)
MAGNESIUM SERPL-MCNC: 1.6 MG/DL (ref 1.5–2.5)
MCH RBC QN AUTO: 30.6 PG (ref 27–33)
MCHC RBC AUTO-ENTMCNC: 31 G/DL (ref 32.3–36.5)
MCV RBC AUTO: 98.7 FL (ref 81.4–97.8)
MONOCYTES # BLD AUTO: 0.77 K/UL (ref 0–0.85)
MONOCYTES NFR BLD AUTO: 9.9 % (ref 0–13.4)
NEUTROPHILS # BLD AUTO: 4.8 K/UL (ref 1.82–7.42)
NEUTROPHILS NFR BLD: 61.8 % (ref 44–72)
NRBC # BLD AUTO: 0 K/UL
NRBC BLD-RTO: 0 /100 WBC (ref 0–0.2)
PLATELET # BLD AUTO: 135 K/UL (ref 164–446)
PMV BLD AUTO: 12.1 FL (ref 9–12.9)
POTASSIUM SERPL-SCNC: 4.2 MMOL/L (ref 3.6–5.5)
PROT SERPL-MCNC: 5.7 G/DL (ref 6–8.2)
PTH-INTACT SERPL-MCNC: 51 PG/ML (ref 14–72)
RBC # BLD AUTO: 3.1 M/UL (ref 4.7–6.1)
SODIUM SERPL-SCNC: 144 MMOL/L (ref 135–145)
WBC # BLD AUTO: 7.8 K/UL (ref 4.8–10.8)

## 2025-08-20 PROCEDURE — 83970 ASSAY OF PARATHORMONE: CPT

## 2025-08-20 PROCEDURE — 80053 COMPREHEN METABOLIC PANEL: CPT

## 2025-08-20 PROCEDURE — 83735 ASSAY OF MAGNESIUM: CPT

## 2025-08-20 PROCEDURE — 85025 COMPLETE CBC W/AUTO DIFF WBC: CPT

## 2025-08-20 PROCEDURE — 36415 COLL VENOUS BLD VENIPUNCTURE: CPT
